# Patient Record
Sex: FEMALE | Race: ASIAN | Employment: OTHER | URBAN - METROPOLITAN AREA
[De-identification: names, ages, dates, MRNs, and addresses within clinical notes are randomized per-mention and may not be internally consistent; named-entity substitution may affect disease eponyms.]

---

## 2017-03-07 DIAGNOSIS — M81.0 OSTEOPOROSIS, UNSPECIFIED: ICD-10-CM

## 2017-03-07 RX ORDER — SODIUM CHLORIDE 9 MG/ML
INJECTION, SOLUTION INTRAVENOUS CONTINUOUS
Status: CANCELLED | OUTPATIENT
Start: 2017-03-10

## 2017-03-07 RX ORDER — ZOLEDRONIC ACID 5 MG/100ML
5 INJECTION, SOLUTION INTRAVENOUS ONCE
Status: CANCELLED | OUTPATIENT
Start: 2017-03-10 | End: 2017-03-10

## 2017-03-07 RX ORDER — SODIUM CHLORIDE 0.9 % (FLUSH) 0.9 %
5 SYRINGE (ML) INJECTION PRN
Status: CANCELLED | OUTPATIENT
Start: 2017-03-10

## 2017-03-07 RX ORDER — SODIUM CHLORIDE 0.9 % (FLUSH) 0.9 %
10 SYRINGE (ML) INJECTION PRN
Status: CANCELLED | OUTPATIENT
Start: 2017-03-10

## 2017-03-07 RX ORDER — 0.9 % SODIUM CHLORIDE 0.9 %
10 VIAL (ML) INJECTION ONCE
Status: CANCELLED | OUTPATIENT
Start: 2017-03-10 | End: 2017-03-10

## 2017-03-07 RX ORDER — METHYLPREDNISOLONE SODIUM SUCCINATE 125 MG/2ML
125 INJECTION, POWDER, LYOPHILIZED, FOR SOLUTION INTRAMUSCULAR; INTRAVENOUS ONCE
Status: CANCELLED | OUTPATIENT
Start: 2017-03-10 | End: 2017-03-10

## 2017-03-07 RX ORDER — HEPARIN SODIUM (PORCINE) LOCK FLUSH IV SOLN 100 UNIT/ML 100 UNIT/ML
500 SOLUTION INTRAVENOUS PRN
Status: CANCELLED | OUTPATIENT
Start: 2017-03-10

## 2017-03-07 RX ORDER — 0.9 % SODIUM CHLORIDE 0.9 %
250 INTRAVENOUS SOLUTION INTRAVENOUS ONCE
Status: CANCELLED | OUTPATIENT
Start: 2017-03-10 | End: 2017-03-10

## 2017-03-07 RX ORDER — 0.9 % SODIUM CHLORIDE 0.9 %
500 INTRAVENOUS SOLUTION INTRAVENOUS ONCE
Status: CANCELLED | OUTPATIENT
Start: 2017-03-10 | End: 2017-03-10

## 2017-03-07 RX ORDER — SODIUM CHLORIDE 9 MG/ML
INJECTION, SOLUTION INTRAVENOUS ONCE
Status: CANCELLED | OUTPATIENT
Start: 2017-03-10 | End: 2017-03-10

## 2017-03-07 RX ORDER — DIPHENHYDRAMINE HYDROCHLORIDE 50 MG/ML
50 INJECTION INTRAMUSCULAR; INTRAVENOUS ONCE
Status: CANCELLED | OUTPATIENT
Start: 2017-03-10 | End: 2017-03-10

## 2017-03-22 ENCOUNTER — HOSPITAL ENCOUNTER (OUTPATIENT)
Dept: INFUSION THERAPY | Age: 72
Discharge: HOME | End: 2017-03-22
Payer: MEDICARE

## 2017-03-22 VITALS
SYSTOLIC BLOOD PRESSURE: 117 MMHG | RESPIRATION RATE: 16 BRPM | TEMPERATURE: 97.8 F | DIASTOLIC BLOOD PRESSURE: 75 MMHG | HEART RATE: 79 BPM

## 2017-03-22 DIAGNOSIS — M81.0 OSTEOPOROSIS, UNSPECIFIED: ICD-10-CM

## 2017-03-22 LAB
ANION GAP SERPL CALCULATED.3IONS-SCNC: 13 MMOL/L (ref 9–17)
BUN BLDV-MCNC: 14 MG/DL (ref 8–23)
BUN/CREAT BLD: ABNORMAL (ref 9–20)
CALCIUM SERPL-MCNC: 9.9 MG/DL (ref 8.6–10.4)
CHLORIDE BLD-SCNC: 95 MMOL/L (ref 98–107)
CO2: 29 MMOL/L (ref 20–31)
CREAT SERPL-MCNC: 0.53 MG/DL (ref 0.5–0.9)
GFR AFRICAN AMERICAN: >60 ML/MIN
GFR NON-AFRICAN AMERICAN: >60 ML/MIN
GFR SERPL CREATININE-BSD FRML MDRD: ABNORMAL ML/MIN/{1.73_M2}
GFR SERPL CREATININE-BSD FRML MDRD: ABNORMAL ML/MIN/{1.73_M2}
GLUCOSE BLD-MCNC: 118 MG/DL (ref 70–99)
MAGNESIUM: 2 MG/DL (ref 1.6–2.6)
PHOSPHORUS: 4.6 MG/DL (ref 2.6–4.5)
POTASSIUM SERPL-SCNC: 4.1 MMOL/L (ref 3.7–5.3)
SODIUM BLD-SCNC: 137 MMOL/L (ref 135–144)

## 2017-03-22 PROCEDURE — 96365 THER/PROPH/DIAG IV INF INIT: CPT

## 2017-03-22 PROCEDURE — 36415 COLL VENOUS BLD VENIPUNCTURE: CPT

## 2017-03-22 PROCEDURE — 84100 ASSAY OF PHOSPHORUS: CPT

## 2017-03-22 PROCEDURE — 80048 BASIC METABOLIC PNL TOTAL CA: CPT

## 2017-03-22 PROCEDURE — 83735 ASSAY OF MAGNESIUM: CPT

## 2017-03-22 PROCEDURE — 2580000003 HC RX 258: Performed by: FAMILY MEDICINE

## 2017-03-22 PROCEDURE — 6360000002 HC RX W HCPCS: Performed by: FAMILY MEDICINE

## 2017-03-22 RX ORDER — SODIUM CHLORIDE 0.9 % (FLUSH) 0.9 %
5 SYRINGE (ML) INJECTION PRN
Status: CANCELLED | OUTPATIENT
Start: 2017-03-22

## 2017-03-22 RX ORDER — SODIUM CHLORIDE 9 MG/ML
INJECTION, SOLUTION INTRAVENOUS ONCE
Status: CANCELLED | OUTPATIENT
Start: 2017-03-22 | End: 2017-03-22

## 2017-03-22 RX ORDER — ZOLEDRONIC ACID 5 MG/100ML
5 INJECTION, SOLUTION INTRAVENOUS ONCE
Status: CANCELLED | OUTPATIENT
Start: 2017-03-22 | End: 2017-03-22

## 2017-03-22 RX ORDER — SODIUM CHLORIDE 9 MG/ML
INJECTION, SOLUTION INTRAVENOUS CONTINUOUS
Status: CANCELLED | OUTPATIENT
Start: 2017-03-22

## 2017-03-22 RX ORDER — 0.9 % SODIUM CHLORIDE 0.9 %
500 INTRAVENOUS SOLUTION INTRAVENOUS ONCE
Status: CANCELLED | OUTPATIENT
Start: 2017-03-22 | End: 2017-03-22

## 2017-03-22 RX ORDER — HEPARIN SODIUM (PORCINE) LOCK FLUSH IV SOLN 100 UNIT/ML 100 UNIT/ML
500 SOLUTION INTRAVENOUS PRN
Status: CANCELLED | OUTPATIENT
Start: 2017-03-22

## 2017-03-22 RX ORDER — 0.9 % SODIUM CHLORIDE 0.9 %
250 INTRAVENOUS SOLUTION INTRAVENOUS ONCE
Status: DISCONTINUED | OUTPATIENT
Start: 2017-03-22 | End: 2017-03-23 | Stop reason: HOSPADM

## 2017-03-22 RX ORDER — 0.9 % SODIUM CHLORIDE 0.9 %
500 INTRAVENOUS SOLUTION INTRAVENOUS ONCE
Status: DISCONTINUED | OUTPATIENT
Start: 2017-03-22 | End: 2017-03-22 | Stop reason: SDUPTHER

## 2017-03-22 RX ORDER — SODIUM CHLORIDE 0.9 % (FLUSH) 0.9 %
10 SYRINGE (ML) INJECTION PRN
Status: CANCELLED | OUTPATIENT
Start: 2017-03-22

## 2017-03-22 RX ORDER — 0.9 % SODIUM CHLORIDE 0.9 %
250 INTRAVENOUS SOLUTION INTRAVENOUS ONCE
Status: CANCELLED | OUTPATIENT
Start: 2017-03-22 | End: 2017-03-22

## 2017-03-22 RX ORDER — 0.9 % SODIUM CHLORIDE 0.9 %
10 VIAL (ML) INJECTION ONCE
Status: CANCELLED | OUTPATIENT
Start: 2017-03-22 | End: 2017-03-22

## 2017-03-22 RX ORDER — ZOLEDRONIC ACID 5 MG/100ML
5 INJECTION, SOLUTION INTRAVENOUS ONCE
Status: COMPLETED | OUTPATIENT
Start: 2017-03-22 | End: 2017-03-22

## 2017-03-22 RX ORDER — SODIUM CHLORIDE 9 MG/ML
INJECTION, SOLUTION INTRAVENOUS ONCE
Status: COMPLETED | OUTPATIENT
Start: 2017-03-22 | End: 2017-03-22

## 2017-03-22 RX ORDER — METHYLPREDNISOLONE SODIUM SUCCINATE 125 MG/2ML
125 INJECTION, POWDER, LYOPHILIZED, FOR SOLUTION INTRAMUSCULAR; INTRAVENOUS ONCE
Status: CANCELLED | OUTPATIENT
Start: 2017-03-22 | End: 2017-03-22

## 2017-03-22 RX ORDER — DIPHENHYDRAMINE HYDROCHLORIDE 50 MG/ML
50 INJECTION INTRAMUSCULAR; INTRAVENOUS ONCE
Status: CANCELLED | OUTPATIENT
Start: 2017-03-22 | End: 2017-03-22

## 2017-03-22 RX ADMIN — ZOLEDRONIC ACID 5 MG: 0.05 INJECTION, SOLUTION INTRAVENOUS at 10:14

## 2017-03-22 RX ADMIN — SODIUM CHLORIDE: 9 INJECTION, SOLUTION INTRAVENOUS at 10:14

## 2017-03-22 NOTE — PROGRESS NOTES
Pt here for Reclast infusion. Labs drawn and reviewed. Pt was treated without incident and discharged in stable condition. Instructed pt to take daily Ca 1200 mg and Vit D 600 Units, pt confirmed understanding.  Pt will return in 1 year for Reclast.

## 2017-03-29 ENCOUNTER — TELEPHONE (OUTPATIENT)
Dept: ONCOLOGY | Age: 72
End: 2017-03-29

## 2017-09-14 ENCOUNTER — HOSPITAL ENCOUNTER (EMERGENCY)
Age: 72
Discharge: HOME | End: 2017-09-14
Attending: EMERGENCY MEDICINE
Payer: MEDICARE

## 2017-09-14 VITALS
BODY MASS INDEX: 20.55 KG/M2 | HEART RATE: 78 BPM | HEIGHT: 63 IN | TEMPERATURE: 98.2 F | OXYGEN SATURATION: 97 % | DIASTOLIC BLOOD PRESSURE: 81 MMHG | WEIGHT: 116 LBS | RESPIRATION RATE: 14 BRPM | SYSTOLIC BLOOD PRESSURE: 124 MMHG

## 2017-09-14 DIAGNOSIS — S46.811A TRAPEZIUS MUSCLE STRAIN, RIGHT, INITIAL ENCOUNTER: ICD-10-CM

## 2017-09-14 DIAGNOSIS — R09.81 NASAL SINUS CONGESTION: Primary | ICD-10-CM

## 2017-09-14 PROCEDURE — 99283 EMERGENCY DEPT VISIT LOW MDM: CPT

## 2017-09-14 RX ORDER — CYCLOBENZAPRINE HCL 10 MG
10 TABLET ORAL 3 TIMES DAILY PRN
Qty: 30 TABLET | Refills: 0 | Status: SHIPPED | OUTPATIENT
Start: 2017-09-14 | End: 2019-08-01 | Stop reason: ALTCHOICE

## 2017-09-14 RX ORDER — FLUTICASONE PROPIONATE 50 MCG
1 SPRAY, SUSPENSION (ML) NASAL 2 TIMES DAILY
Qty: 1 BOTTLE | Refills: 0 | Status: SHIPPED | OUTPATIENT
Start: 2017-09-14 | End: 2019-08-01 | Stop reason: ALTCHOICE

## 2017-09-14 ASSESSMENT — PAIN SCALES - GENERAL: PAINLEVEL_OUTOF10: 5

## 2017-09-14 ASSESSMENT — PAIN DESCRIPTION - LOCATION: LOCATION: HEAD;FACE

## 2017-09-14 ASSESSMENT — PAIN DESCRIPTION - DESCRIPTORS: DESCRIPTORS: PRESSURE;ACHING

## 2017-09-14 ASSESSMENT — PAIN DESCRIPTION - ORIENTATION: ORIENTATION: POSTERIOR;RIGHT

## 2019-08-01 ENCOUNTER — HOSPITAL ENCOUNTER (EMERGENCY)
Age: 74
Discharge: HOME | End: 2019-08-01
Attending: EMERGENCY MEDICINE
Payer: MEDICARE

## 2019-08-01 ENCOUNTER — APPOINTMENT (OUTPATIENT)
Dept: GENERAL RADIOLOGY | Age: 74
End: 2019-08-01
Payer: MEDICARE

## 2019-08-01 VITALS
TEMPERATURE: 98.2 F | SYSTOLIC BLOOD PRESSURE: 126 MMHG | HEART RATE: 79 BPM | BODY MASS INDEX: 20.91 KG/M2 | RESPIRATION RATE: 18 BRPM | WEIGHT: 118 LBS | OXYGEN SATURATION: 99 % | HEIGHT: 63 IN | DIASTOLIC BLOOD PRESSURE: 65 MMHG

## 2019-08-01 DIAGNOSIS — R07.9 CHEST PAIN, UNSPECIFIED TYPE: Primary | ICD-10-CM

## 2019-08-01 LAB
ABSOLUTE EOS #: 0.1 K/UL (ref 0–0.4)
ABSOLUTE IMMATURE GRANULOCYTE: ABNORMAL K/UL (ref 0–0.3)
ABSOLUTE LYMPH #: 2.1 K/UL (ref 1–4.8)
ABSOLUTE MONO #: 0.4 K/UL (ref 0.1–1.2)
ANION GAP SERPL CALCULATED.3IONS-SCNC: 10 MMOL/L (ref 9–17)
BASOPHILS # BLD: 1 % (ref 0–2)
BASOPHILS ABSOLUTE: 0 K/UL (ref 0–0.2)
BUN BLDV-MCNC: 12 MG/DL (ref 8–23)
BUN/CREAT BLD: ABNORMAL (ref 9–20)
CALCIUM SERPL-MCNC: 9.2 MG/DL (ref 8.6–10.4)
CHLORIDE BLD-SCNC: 102 MMOL/L (ref 98–107)
CO2: 24 MMOL/L (ref 20–31)
CREAT SERPL-MCNC: 0.53 MG/DL (ref 0.5–0.9)
DIFFERENTIAL TYPE: ABNORMAL
EOSINOPHILS RELATIVE PERCENT: 2 % (ref 1–4)
GFR AFRICAN AMERICAN: >60 ML/MIN
GFR NON-AFRICAN AMERICAN: >60 ML/MIN
GFR SERPL CREATININE-BSD FRML MDRD: ABNORMAL ML/MIN/{1.73_M2}
GFR SERPL CREATININE-BSD FRML MDRD: ABNORMAL ML/MIN/{1.73_M2}
GLUCOSE BLD-MCNC: 162 MG/DL (ref 70–99)
HCT VFR BLD CALC: 32.7 % (ref 36–46)
HEMOGLOBIN: 11.1 G/DL (ref 12–16)
IMMATURE GRANULOCYTES: ABNORMAL %
LYMPHOCYTES # BLD: 32 % (ref 24–44)
MCH RBC QN AUTO: 28.1 PG (ref 26–34)
MCHC RBC AUTO-ENTMCNC: 33.9 G/DL (ref 31–37)
MCV RBC AUTO: 82.7 FL (ref 80–100)
MONOCYTES # BLD: 7 % (ref 2–11)
NRBC AUTOMATED: ABNORMAL PER 100 WBC
PDW BLD-RTO: 17.5 % (ref 12.5–15.4)
PLATELET # BLD: 344 K/UL (ref 140–450)
PLATELET ESTIMATE: ABNORMAL
PMV BLD AUTO: 7.1 FL (ref 6–12)
POTASSIUM SERPL-SCNC: 3.8 MMOL/L (ref 3.7–5.3)
RBC # BLD: 3.95 M/UL (ref 4–5.2)
RBC # BLD: ABNORMAL 10*6/UL
SEG NEUTROPHILS: 58 % (ref 36–66)
SEGMENTED NEUTROPHILS ABSOLUTE COUNT: 3.9 K/UL (ref 1.8–7.7)
SODIUM BLD-SCNC: 136 MMOL/L (ref 135–144)
TROPONIN INTERP: NORMAL
TROPONIN T: NORMAL NG/ML
TROPONIN, HIGH SENSITIVITY: <6 NG/L (ref 0–14)
WBC # BLD: 6.6 K/UL (ref 3.5–11)
WBC # BLD: ABNORMAL 10*3/UL

## 2019-08-01 PROCEDURE — 80048 BASIC METABOLIC PNL TOTAL CA: CPT

## 2019-08-01 PROCEDURE — 36415 COLL VENOUS BLD VENIPUNCTURE: CPT

## 2019-08-01 PROCEDURE — 93005 ELECTROCARDIOGRAM TRACING: CPT | Performed by: EMERGENCY MEDICINE

## 2019-08-01 PROCEDURE — 85025 COMPLETE CBC W/AUTO DIFF WBC: CPT

## 2019-08-01 PROCEDURE — 84484 ASSAY OF TROPONIN QUANT: CPT

## 2019-08-01 PROCEDURE — 99285 EMERGENCY DEPT VISIT HI MDM: CPT

## 2019-08-01 PROCEDURE — 71045 X-RAY EXAM CHEST 1 VIEW: CPT

## 2019-08-01 NOTE — ED PROVIDER NOTES
NOT REPORTED     Seg Neutrophils 58 36 - 66 %    Lymphocytes 32 24 - 44 %    Monocytes 7 2 - 11 %    Eosinophils % 2 1 - 4 %    Basophils 1 0 - 2 %    Immature Granulocytes NOT REPORTED 0 %    Segs Absolute 3.90 1.8 - 7.7 k/uL    Absolute Lymph # 2.10 1.0 - 4.8 k/uL    Absolute Mono # 0.40 0.1 - 1.2 k/uL    Absolute Eos # 0.10 0.0 - 0.4 k/uL    Basophils # 0.00 0.0 - 0.2 k/uL    Absolute Immature Granulocyte NOT REPORTED 0.00 - 0.30 k/uL    WBC Morphology NOT REPORTED     RBC Morphology NOT REPORTED     Platelet Estimate NOT REPORTED    Troponin   Result Value Ref Range    Troponin, High Sensitivity <6 0 - 14 ng/L    Troponin T NOT REPORTED <0.03 ng/mL    Troponin Interp NOT REPORTED    EKG 12 Lead   Result Value Ref Range    Ventricular Rate 70 BPM    Atrial Rate 70 BPM    P-R Interval 162 ms    QRS Duration 142 ms    Q-T Interval 448 ms    QTc Calculation (Bazett) 483 ms    P Axis 8 degrees    R Axis -16 degrees    T Axis 76 degrees       ABNORMAL LABS:  Labs Reviewed   BASIC METABOLIC PANEL - Abnormal; Notable for the following components:       Result Value    Glucose 162 (*)     All other components within normal limits   CBC WITH AUTO DIFFERENTIAL - Abnormal; Notable for the following components:    RBC 3.95 (*)     Hemoglobin 11.1 (*)     Hematocrit 32.7 (*)     RDW 17.5 (*)     All other components within normal limits   TROPONIN        EKG: All EKG's are interpreted by the Emergency Department Physician who either signs or Co-signs this chart in the absence of a cardiologist.  EKG shows a normal sinus rhythm of 70 with a CO interval 162  QTc 483.  -16 axis. Left bundle branch block pattern. No old to compare with. I interpreted.       EMERGENCY DEPARTMENT COURSE:   Vitals:    Vitals:    08/01/19 1157   BP: 126/65   Pulse: 79   Resp: 18   Temp: 98.2 °F (36.8 °C)   TempSrc: Oral   SpO2: 99%   Weight: 53.5 kg (118 lb)   Height: 5' 3\" (1.6 m)     -------------------------  BP: 126/65, Temp: 98.2 °F

## 2019-08-04 LAB
EKG ATRIAL RATE: 70 BPM
EKG P AXIS: 8 DEGREES
EKG P-R INTERVAL: 162 MS
EKG Q-T INTERVAL: 448 MS
EKG QRS DURATION: 142 MS
EKG QTC CALCULATION (BAZETT): 483 MS
EKG R AXIS: -16 DEGREES
EKG T AXIS: 76 DEGREES
EKG VENTRICULAR RATE: 70 BPM

## 2020-10-05 ENCOUNTER — HOSPITAL ENCOUNTER (OUTPATIENT)
Age: 75
Discharge: HOME | End: 2020-10-07
Payer: MEDICARE

## 2020-10-05 ENCOUNTER — HOSPITAL ENCOUNTER (OUTPATIENT)
Dept: GENERAL RADIOLOGY | Age: 75
Discharge: HOME | End: 2020-10-07
Payer: MEDICARE

## 2020-10-05 PROCEDURE — 72040 X-RAY EXAM NECK SPINE 2-3 VW: CPT

## 2020-12-23 ENCOUNTER — HOSPITAL ENCOUNTER (OUTPATIENT)
Age: 75
Discharge: HOME | End: 2020-12-25
Payer: MEDICARE

## 2020-12-23 ENCOUNTER — HOSPITAL ENCOUNTER (OUTPATIENT)
Dept: GENERAL RADIOLOGY | Age: 75
Discharge: HOME | End: 2020-12-25
Payer: MEDICARE

## 2020-12-23 PROCEDURE — 72190 X-RAY EXAM OF PELVIS: CPT

## 2021-03-29 ENCOUNTER — HOSPITAL ENCOUNTER (EMERGENCY)
Age: 76
Discharge: HOME | End: 2021-03-29
Attending: EMERGENCY MEDICINE
Payer: MEDICARE

## 2021-03-29 ENCOUNTER — APPOINTMENT (OUTPATIENT)
Dept: GENERAL RADIOLOGY | Age: 76
End: 2021-03-29
Payer: MEDICARE

## 2021-03-29 ENCOUNTER — APPOINTMENT (OUTPATIENT)
Dept: CT IMAGING | Age: 76
End: 2021-03-29
Payer: MEDICARE

## 2021-03-29 VITALS
WEIGHT: 120 LBS | HEART RATE: 83 BPM | RESPIRATION RATE: 16 BRPM | SYSTOLIC BLOOD PRESSURE: 129 MMHG | BODY MASS INDEX: 21.26 KG/M2 | OXYGEN SATURATION: 100 % | DIASTOLIC BLOOD PRESSURE: 69 MMHG | TEMPERATURE: 98.1 F

## 2021-03-29 DIAGNOSIS — K21.9 GASTROESOPHAGEAL REFLUX DISEASE, UNSPECIFIED WHETHER ESOPHAGITIS PRESENT: Primary | ICD-10-CM

## 2021-03-29 LAB
-: ABNORMAL
ABSOLUTE EOS #: 0.1 K/UL (ref 0–0.4)
ABSOLUTE IMMATURE GRANULOCYTE: ABNORMAL K/UL (ref 0–0.3)
ABSOLUTE LYMPH #: 2.3 K/UL (ref 1–4.8)
ABSOLUTE MONO #: 0.4 K/UL (ref 0.1–1.2)
ALBUMIN SERPL-MCNC: 4.5 G/DL (ref 3.5–5.2)
ALBUMIN/GLOBULIN RATIO: 1.5 (ref 1–2.5)
ALP BLD-CCNC: 57 U/L (ref 35–104)
ALT SERPL-CCNC: 16 U/L (ref 5–33)
AMORPHOUS: ABNORMAL
ANION GAP SERPL CALCULATED.3IONS-SCNC: 11 MMOL/L (ref 9–17)
AST SERPL-CCNC: 23 U/L
BACTERIA: ABNORMAL
BASOPHILS # BLD: 1 % (ref 0–2)
BASOPHILS ABSOLUTE: 0 K/UL (ref 0–0.2)
BILIRUB SERPL-MCNC: 0.52 MG/DL (ref 0.3–1.2)
BILIRUBIN DIRECT: 0.16 MG/DL
BILIRUBIN URINE: NEGATIVE
BILIRUBIN, INDIRECT: 0.36 MG/DL (ref 0–1)
BUN BLDV-MCNC: 14 MG/DL (ref 8–23)
BUN/CREAT BLD: ABNORMAL (ref 9–20)
CALCIUM SERPL-MCNC: 9.8 MG/DL (ref 8.6–10.4)
CASTS UA: ABNORMAL /LPF
CHLORIDE BLD-SCNC: 100 MMOL/L (ref 98–107)
CO2: 24 MMOL/L (ref 20–31)
COLOR: YELLOW
COMMENT UA: ABNORMAL
CREAT SERPL-MCNC: 0.78 MG/DL (ref 0.5–0.9)
CRYSTALS, UA: ABNORMAL /HPF
DIFFERENTIAL TYPE: ABNORMAL
EOSINOPHILS RELATIVE PERCENT: 1 % (ref 1–4)
EPITHELIAL CELLS UA: ABNORMAL /HPF (ref 0–5)
GFR AFRICAN AMERICAN: >60 ML/MIN
GFR NON-AFRICAN AMERICAN: >60 ML/MIN
GFR SERPL CREATININE-BSD FRML MDRD: ABNORMAL ML/MIN/{1.73_M2}
GFR SERPL CREATININE-BSD FRML MDRD: ABNORMAL ML/MIN/{1.73_M2}
GLOBULIN: NORMAL G/DL (ref 1.5–3.8)
GLUCOSE BLD-MCNC: 101 MG/DL (ref 70–99)
GLUCOSE URINE: NEGATIVE
HCT VFR BLD CALC: 34 % (ref 36–46)
HEMOGLOBIN: 11.2 G/DL (ref 12–16)
IMMATURE GRANULOCYTES: ABNORMAL %
KETONES, URINE: NEGATIVE
LEUKOCYTE ESTERASE, URINE: ABNORMAL
LIPASE: 36 U/L (ref 13–60)
LYMPHOCYTES # BLD: 42 % (ref 24–44)
MCH RBC QN AUTO: 26.5 PG (ref 26–34)
MCHC RBC AUTO-ENTMCNC: 32.9 G/DL (ref 31–37)
MCV RBC AUTO: 80.5 FL (ref 80–100)
MONOCYTES # BLD: 7 % (ref 2–11)
MUCUS: ABNORMAL
NITRITE, URINE: NEGATIVE
NRBC AUTOMATED: ABNORMAL PER 100 WBC
OTHER OBSERVATIONS UA: ABNORMAL
PDW BLD-RTO: 17.4 % (ref 12.5–15.4)
PH UA: 6 (ref 5–8)
PLATELET # BLD: 338 K/UL (ref 140–450)
PLATELET ESTIMATE: ABNORMAL
PMV BLD AUTO: 7.1 FL (ref 6–12)
POTASSIUM SERPL-SCNC: 3.6 MMOL/L (ref 3.7–5.3)
PROTEIN UA: NEGATIVE
RBC # BLD: 4.22 M/UL (ref 4–5.2)
RBC # BLD: ABNORMAL 10*6/UL
RBC UA: ABNORMAL /HPF (ref 0–2)
RENAL EPITHELIAL, UA: ABNORMAL /HPF
SEG NEUTROPHILS: 49 % (ref 36–66)
SEGMENTED NEUTROPHILS ABSOLUTE COUNT: 2.7 K/UL (ref 1.8–7.7)
SODIUM BLD-SCNC: 135 MMOL/L (ref 135–144)
SPECIFIC GRAVITY UA: 1.01 (ref 1–1.03)
TOTAL PROTEIN: 7.6 G/DL (ref 6.4–8.3)
TRICHOMONAS: ABNORMAL
TROPONIN INTERP: NORMAL
TROPONIN INTERP: NORMAL
TROPONIN T: NORMAL NG/ML
TROPONIN T: NORMAL NG/ML
TROPONIN, HIGH SENSITIVITY: 6 NG/L (ref 0–14)
TROPONIN, HIGH SENSITIVITY: 9 NG/L (ref 0–14)
TURBIDITY: CLEAR
URINE HGB: NEGATIVE
UROBILINOGEN, URINE: NORMAL
WBC # BLD: 5.5 K/UL (ref 3.5–11)
WBC # BLD: ABNORMAL 10*3/UL
WBC UA: ABNORMAL /HPF (ref 0–5)
YEAST: ABNORMAL

## 2021-03-29 PROCEDURE — 84484 ASSAY OF TROPONIN QUANT: CPT

## 2021-03-29 PROCEDURE — 96374 THER/PROPH/DIAG INJ IV PUSH: CPT

## 2021-03-29 PROCEDURE — 93005 ELECTROCARDIOGRAM TRACING: CPT | Performed by: EMERGENCY MEDICINE

## 2021-03-29 PROCEDURE — 74177 CT ABD & PELVIS W/CONTRAST: CPT

## 2021-03-29 PROCEDURE — 71046 X-RAY EXAM CHEST 2 VIEWS: CPT

## 2021-03-29 PROCEDURE — 6360000002 HC RX W HCPCS: Performed by: EMERGENCY MEDICINE

## 2021-03-29 PROCEDURE — 81001 URINALYSIS AUTO W/SCOPE: CPT

## 2021-03-29 PROCEDURE — 80076 HEPATIC FUNCTION PANEL: CPT

## 2021-03-29 PROCEDURE — 99284 EMERGENCY DEPT VISIT MOD MDM: CPT

## 2021-03-29 PROCEDURE — 74018 RADEX ABDOMEN 1 VIEW: CPT

## 2021-03-29 PROCEDURE — 80048 BASIC METABOLIC PNL TOTAL CA: CPT

## 2021-03-29 PROCEDURE — 85025 COMPLETE CBC W/AUTO DIFF WBC: CPT

## 2021-03-29 PROCEDURE — 6360000004 HC RX CONTRAST MEDICATION: Performed by: EMERGENCY MEDICINE

## 2021-03-29 PROCEDURE — 36415 COLL VENOUS BLD VENIPUNCTURE: CPT

## 2021-03-29 PROCEDURE — C9113 INJ PANTOPRAZOLE SODIUM, VIA: HCPCS | Performed by: EMERGENCY MEDICINE

## 2021-03-29 PROCEDURE — 83690 ASSAY OF LIPASE: CPT

## 2021-03-29 PROCEDURE — 2580000003 HC RX 258: Performed by: EMERGENCY MEDICINE

## 2021-03-29 RX ORDER — SODIUM CHLORIDE 9 MG/ML
10 INJECTION INTRAVENOUS ONCE
Status: COMPLETED | OUTPATIENT
Start: 2021-03-29 | End: 2021-03-29

## 2021-03-29 RX ORDER — PANTOPRAZOLE SODIUM 40 MG/10ML
40 INJECTION, POWDER, LYOPHILIZED, FOR SOLUTION INTRAVENOUS ONCE
Status: COMPLETED | OUTPATIENT
Start: 2021-03-29 | End: 2021-03-29

## 2021-03-29 RX ORDER — SODIUM CHLORIDE 0.9 % (FLUSH) 0.9 %
10 SYRINGE (ML) INJECTION PRN
Status: DISCONTINUED | OUTPATIENT
Start: 2021-03-29 | End: 2021-03-29 | Stop reason: HOSPADM

## 2021-03-29 RX ORDER — 0.9 % SODIUM CHLORIDE 0.9 %
80 INTRAVENOUS SOLUTION INTRAVENOUS ONCE
Status: COMPLETED | OUTPATIENT
Start: 2021-03-29 | End: 2021-03-29

## 2021-03-29 RX ORDER — PANTOPRAZOLE SODIUM 20 MG/1
40 TABLET, DELAYED RELEASE ORAL DAILY
Qty: 30 TABLET | Refills: 0 | Status: SHIPPED | OUTPATIENT
Start: 2021-03-29

## 2021-03-29 RX ADMIN — IOPAMIDOL 75 ML: 755 INJECTION, SOLUTION INTRAVENOUS at 10:11

## 2021-03-29 RX ADMIN — SODIUM CHLORIDE, PRESERVATIVE FREE 10 ML: 5 INJECTION INTRAVENOUS at 10:11

## 2021-03-29 RX ADMIN — SODIUM CHLORIDE 80 ML: 9 INJECTION, SOLUTION INTRAVENOUS at 10:11

## 2021-03-29 RX ADMIN — PANTOPRAZOLE SODIUM 40 MG: 40 INJECTION, POWDER, FOR SOLUTION INTRAVENOUS at 09:13

## 2021-03-29 RX ADMIN — SODIUM CHLORIDE 10 ML: 9 INJECTION, SOLUTION INTRAMUSCULAR; INTRAVENOUS; SUBCUTANEOUS at 09:14

## 2021-03-29 ASSESSMENT — ENCOUNTER SYMPTOMS
SORE THROAT: 0
PHOTOPHOBIA: 0
CHEST TIGHTNESS: 1
SHORTNESS OF BREATH: 1
NAUSEA: 1
DIARRHEA: 1
ABDOMINAL PAIN: 0

## 2021-03-29 ASSESSMENT — PAIN DESCRIPTION - LOCATION: LOCATION: ABDOMEN

## 2021-03-29 ASSESSMENT — PAIN SCALES - GENERAL: PAINLEVEL_OUTOF10: 4

## 2021-03-29 ASSESSMENT — PAIN DESCRIPTION - PAIN TYPE: TYPE: ACUTE PAIN;CHRONIC PAIN

## 2021-03-29 NOTE — ED PROVIDER NOTES
[id number] [facility] ED  [address] [facility] [address]  Phone: [phone]  Fax: [phone]        Pt Name: Raissa Ham  MRN: [id number]  [place] 1945  Date of evaluation: 3/29/21      CHIEF COMPLAINT     Chief Complaint   Patient presents with    Chest Pain    Abdominal Pain    Nausea    Diarrhea         HISTORY OF PRESENT ILLNESS  (Location/Symptom, Timing/Onset, Context/Setting, Quality, Duration, Modifying Factors, Severity.)    Raissa Ham is a 77 y.o. female who presents stating she has an ulcer problem. She states that on February 25 she was eating something and it upset her stomach. The patient states that at that time, she felt the food got stuck. She states she saw her GI nurse practitioner after that event and she told him this and he said to take Maalox. She states the NP said to return in 6 months and she would see the doctor at that time. She states she had a barium swallow, but she is not sure when it was done. It sounds like she has had some testing done at [facility]. The patient reports she has had a four hour test at that time and had one previously 4 years ago. She then saw her PCP who told her to also take Maalox. She also reports having some left sided chest pain that is worse with respirations. She states that yesterday morning she started having diarrhea. She reports a lot of rumbling in her abdomen. She states she ate chicken soup yesterday and drank water. Her diarrhea improved and she is having soft stools. Her stomach still feels upset. She states her upset stomach symptoms have been ongoing for 1 month, almost daily. She reports a 10 lb unintentional weight loss. She normally weighs 122 pounds, but she weighed herself and was weighing 112 pounds. Here in the emergency department today, the patient is 120 pounds. She states she has abdominal pain that lasts all night, but it is not currently present.  She does not feel her stomach is upset now. She states that her left sided chest pain is intermittent and not currently present. She feels it is associated with her upset stomach, and notices it more after eating. It is not associated with physical exertion. She reports shortness of breath with this chest pain and nausea. She states her last EGD was 7 years ago. The patient is on Nexium and states she takes it every day, but she does not feel it is helping. She states she feels she cannot eat. She has a prior smoking history, hypertension, and is 77years old in terms of cardiac risk factors. There are multiple GI notes from the [facility] system in [facility]. As summarized from her last GI clinic note by Donal Du, APRN-CNP:      EGD 06/01/2017 by Dr. Andres Al showed normal upper third of esophagus, middle third of esophagus and lower third of esophagus. Medium-sized paraesophageal hernia. Erythematous mucosa in the gastric body and antrum. Biopsied. Normal first portion of the duodenum and second portion of the duodenum. PATH: Stomach-negative. No H pylori. Recommendation: Refer to a surgeon. Dr. Josh Alejandro referral on 7/6/2017 and CT showed no evidence of paraesophageal hernia. She does have a high-riding fundus, which as a result appears as if she has a current esophageal hernia on endoscopy. 8/24/2020 [facility] at [facility]: Normal exam at one hour    EGD 08/2013 by Dr. Andres Al showed slightly irregular Z-line s/p biopsies, otherwise esophagus appeared normal, abnormal configuration of the stomach suggesting J-shaped stomach, mild gastritis in the antrum and biopsy, normal duodenal bulb and 2nd portion of the duodenum. PATH: Small bowel -normal small bowel mucosa. No enteritis or celiac disease. Gastric antrum- normal gastric mucosa. No H pylori. Gastric fundus -normal gastric fundus mucosa. No H pylori, Intestinal metaplasia or dysplasia. GE junction - normal GE junction no active esophagitis, intestinal metaplasia or dysplasia.     EGD 04/2012 by Dr. Estrellita Whiteside for dyspepsia and dysphagia showed multiple plaques in the lower 3rd of the esophagus and biopsy, proximal inlet patch, normal examined duodenum, mild gastritis and possible paraesophageal hernia. PATH: Esophagus- Negative. Small-bowel- negative. Stomach-mild chronic gastritis, intestinal metaplasia, no H pylori. Esophagus- mild to moderate chronic esophagitis. Colonoscopy 9/2011 by Dr. Delia Castle showed a normal colon. Colonoscopy 3/2009 by Dr. Estrellita Whiteside for rectal bleeding and diarrhea showed probable ischemic colitis In the distal transverse colon at the splenic flexure and proximal descending colon. PATH: Consistent with resolving or subacute ischemic colitis. Differential includes resolving infectious/self-limited colitis. Colonoscopy 07/2004 by Dr. Chance Sewell for intermittent rectal bleeding, stool positive for OB and chronic constipation showed 2 mm rectal polyp Otherwise normal colon. PATH: Rectal polyp- colonic mucosa with no significant pathologic findings. REVIEW OF SYSTEMS    (2-9 systems for level 4, 10 or more for level 5)     Review of Systems   Constitutional: Negative for chills and fever. HENT: Negative for congestion, ear pain and sore throat. Eyes: Negative for photophobia and visual disturbance. Respiratory: Positive for chest tightness and shortness of breath. Cardiovascular: Positive for chest pain. Negative for palpitations. Gastrointestinal: Positive for diarrhea and nausea. Negative for abdominal pain. Genitourinary: Negative for dysuria, frequency and urgency. Musculoskeletal: Negative for arthralgias and myalgias. Skin: Negative for wound. Neurological: Negative for dizziness and headaches. Hematological: Negative for adenopathy. Does not bruise/bleed easily. PAST MEDICAL HISTORY    has a past medical history of GERD (gastroesophageal reflux disease) and Hypertension.     SURGICAL HISTORY      has a past surgical history that includes pre-malignant / benign skin lesion excision (2/14/13). Cholecystectomy   EGD   Colonoscopy    CURRENTMEDICATIONS       Previous Medications    ASPIRIN 81 MG TABLET    Take 81 mg by mouth daily. FOSINOPRIL SODIUM-HCTZ (MONOPRIL HCT PO)    Take 10 mg by mouth daily     HYDROCHLOROTHIAZIDE PO    Take 25 mg by mouth daily        ALLERGIES     is allergic to seasonal.    FAMILY HISTORY     has no family status information on file. family history is not on file. SOCIAL HISTORY      reports that she has quit smoking. She has never used smokeless tobacco. She reports previous alcohol use. PHYSICAL EXAM    (up to 7 for level 4, 8 or more for level 5)   INITIAL VITALS:  weight is 54.4 kg (120 lb). Her oral temperature is 98.1 °F (36.7 °C). Her blood pressure is 129/69 and her pulse is 83. Her respiration is 16 and oxygen saturation is 100%. Physical Exam  Vitals signs and nursing note reviewed. Constitutional:       General: She is not in acute distress. Appearance: She is well-developed and normal weight. She is not ill-appearing, toxic-appearing or diaphoretic. HENT:      Head: Normocephalic and atraumatic. Cardiovascular:      Rate and Rhythm: Normal rate and regular rhythm. Heart sounds: Normal heart sounds. No murmur. No friction rub. No gallop. Pulmonary:      Effort: Pulmonary effort is normal.      Breath sounds: Normal breath sounds. No wheezing, rhonchi or rales. Abdominal:      General: Abdomen is flat. Bowel sounds are normal. There is no distension. Palpations: Abdomen is soft. Tenderness: There is no abdominal tenderness. Negative signs include Mcdonald's sign and McBurney's sign. Skin:     General: Skin is warm and dry. Capillary Refill: Capillary refill takes less than 2 seconds. Neurological:      General: No focal deficit present. Mental Status: She is alert and oriented to person, place, and time.          DIFFERENTIAL DIAGNOSIS/ MDM: Radiologist below, if available at the time of this note:    Xr Chest (2 Vw)    Result Date: 3/29/2021  EXAMINATION: TWO XRAY VIEWS OF THE CHEST; ONE SUPINE XRAY VIEW(S) OF THE ABDOMEN 3/29/2021 8:57 am COMPARISON: August 1, 2019 HISTORY: ORDERING SYSTEM PROVIDED HISTORY: chest pain TECHNOLOGIST PROVIDED HISTORY: chest pain Reason for Exam: abd discomfort Acuity: Chronic Type of Exam: Initial Additional signs and symptoms: diarrhea Relevant Medical/Surgical History: hx HTN and GERD FINDINGS: Stable cardiomediastinal silhouette. No pulmonary venous congestion or edema. No focal lung consolidation or infiltrate. No pleural effusion or pneumothorax. No intraperitoneal free air. There are mildly dilated loops of small bowel in the upper abdomen and disproportionally decreased bowel gas in the lower abdomen. No mass effect. Osseous structures are grossly intact. No acute cardiopulmonary process. Mildly dilated loops of small bowel in the upper abdomen with disproportionately decreased bowel gas in the lower abdomen. History states diarrhea, therefore this could reflect enteritis. However, the possibility of partial small bowel obstruction with 3 difficult to exclude. CT abdomen and pelvis can be obtained as clinically warranted. Xr Abdomen (kub) (single Ap View)    Result Date: 3/29/2021  EXAMINATION: TWO XRAY VIEWS OF THE CHEST; ONE SUPINE XRAY VIEW(S) OF THE ABDOMEN 3/29/2021 8:57 am COMPARISON: August 1, 2019 HISTORY: ORDERING SYSTEM PROVIDED HISTORY: chest pain TECHNOLOGIST PROVIDED HISTORY: chest pain Reason for Exam: abd discomfort Acuity: Chronic Type of Exam: Initial Additional signs and symptoms: diarrhea Relevant Medical/Surgical History: hx HTN and GERD FINDINGS: Stable cardiomediastinal silhouette. No pulmonary venous congestion or edema. No focal lung consolidation or infiltrate. No pleural effusion or pneumothorax. No intraperitoneal free air.  There are mildly dilated loops of small bowel in the upper abdomen and disproportionally decreased bowel gas in the lower abdomen. No mass effect. Osseous structures are grossly intact. No acute cardiopulmonary process. Mildly dilated loops of small bowel in the upper abdomen with disproportionately decreased bowel gas in the lower abdomen. History states diarrhea, therefore this could reflect enteritis. However, the possibility of partial small bowel obstruction with 3 difficult to exclude. CT abdomen and pelvis can be obtained as clinically warranted. Ct Abdomen Pelvis W Iv Contrast Additional Contrast? None    Result Date: 3/29/2021  EXAMINATION: CT OF THE ABDOMEN AND PELVIS WITH CONTRAST 3/29/2021 10:00 am TECHNIQUE: CT of the abdomen and pelvis was performed with the administration of intravenous contrast. Multiplanar reformatted images are provided for review. Dose modulation, iterative reconstruction, and/or weight based adjustment of the mA/kV was utilized to reduce the radiation dose to as low as reasonably achievable. COMPARISON: None. HISTORY: ORDERING SYSTEM PROVIDED HISTORY: abdominal pain TECHNOLOGIST PROVIDED HISTORY: abdominal pain Decision Support Exception->Emergency Medical Condition (MA) Reason for Exam: abdomina pain, nausea, diarrhea Acuity: Unknown Type of Exam: Unknown FINDINGS: Lower Chest: The lung bases are without consolidation or effusion. The visualized cardiac structures are unremarkable. Organs: Liver and spleen are normal size and overall attenuation. The gallbladder is not visualized and may have been removed. The pancreas and adrenal glands are unremarkable. The kidneys are without obstructive uropathy. There is a simple right renal cyst.  The ureters are not dilated. The urinary bladder is unremarkable. GI/Bowel: The stomach is contracted and otherwise unremarkable. Loops of small bowel are normal in caliber without evidence for obstruction.   The colon contains air and fecal residue and is otherwise unremarkable. There is no intraperitoneal free air or free fluid. The appendix is normal. Pelvis: The uterus is age-appropriate. Peritoneum/Retroperitoneum: The psoas muscles are symmetric. The abdominal aorta is normal in caliber. The inferior vena cava is unremarkable. There is no retroperitoneal or mesenteric adenopathy. Bones/Soft Tissues: The extra-abdominal soft tissues are unremarkable. There is no acute osseous abnormality. No acute abdominal or pelvic abnormality.        LABS:  Results for orders placed or performed during the hospital encounter of 60/83/15   Basic Metabolic Panel   Result Value Ref Range    Glucose 101 (H) 70 - 99 mg/dL    BUN 14 8 - 23 mg/dL    CREATININE 0.78 0.50 - 0.90 mg/dL    Bun/Cre Ratio NOT REPORTED 9 - 20    Calcium 9.8 8.6 - 10.4 mg/dL    Sodium 135 135 - 144 mmol/L    Potassium 3.6 (L) 3.7 - 5.3 mmol/L    Chloride 100 98 - 107 mmol/L    CO2 24 20 - 31 mmol/L    Anion Gap 11 9 - 17 mmol/L    GFR Non-African American >60 >60 mL/min    GFR African American >60 >60 mL/min    GFR Comment          GFR Staging NOT REPORTED    CBC Auto Differential   Result Value Ref Range    WBC 5.5 3.5 - 11.0 k/uL    RBC 4.22 4.0 - 5.2 m/uL    Hemoglobin 11.2 (L) 12.0 - 16.0 g/dL    Hematocrit 34.0 (L) 36 - 46 %    MCV 80.5 80 - 100 fL    MCH 26.5 26 - 34 pg    MCHC 32.9 31 - 37 g/dL    RDW 17.4 (H) 12.5 - 15.4 %    Platelets 777 146 - 102 k/uL    MPV 7.1 6.0 - 12.0 fL    NRBC Automated NOT REPORTED per 100 WBC    Differential Type NOT REPORTED     Seg Neutrophils 49 36 - 66 %    Lymphocytes 42 24 - 44 %    Monocytes 7 2 - 11 %    Eosinophils % 1 1 - 4 %    Basophils 1 0 - 2 %    Immature Granulocytes NOT REPORTED 0 %    Segs Absolute 2.70 1.8 - 7.7 k/uL    Absolute Lymph # 2.30 1.0 - 4.8 k/uL    Absolute Mono # 0.40 0.1 - 1.2 k/uL    Absolute Eos # 0.10 0.0 - 0.4 k/uL    Basophils Absolute 0.00 0.0 - 0.2 k/uL    Absolute Immature Granulocyte NOT REPORTED 0.00 - 0.30 k/uL    WBC Morphology 129/69   Pulse: 82 73 83   Resp: 16 14 16   Temp: 98.1 °F (36.7 °C)     TempSrc: Oral  Oral   SpO2: 99% 99% 100%   Weight: 54.4 kg (120 lb)       -------------------------  BP: 129/69, Temp: 98.1 °F (36.7 °C), Pulse: 83, Resp: 16      RE-EVALUATION:  11:39 Patient resting comfortably. Updated on results. She will follow up with her GI.     CONSULTS:  None     PROCEDURES:  None    FINAL IMPRESSION      1. Gastroesophageal reflux disease, unspecified whether esophagitis present          DISPOSITION/PLAN   DISPOSITION        CONDITION ON DISPOSITION:   Stable     PATIENT REFERRED TO:  Aris Beebe MD  [place] [id number]    Schedule an appointment as soon as possible for a visit in 2 days        please call your gastroentologist for a follow up appointment this week.           DISCHARGE MEDICATIONS:  New Prescriptions    PANTOPRAZOLE (PROTONIX) 20 MG TABLET    Take 2 tablets by mouth daily       (Please note that portions of this note were completed with a voicerecognition program.  Efforts were made to edit the dictations but occasionally words are mis-transcribed.)    Aden Aguila MD  Attending Emergency Medicine Physician        Aden Aguila MD  03/29/21 [id number]

## 2021-03-29 NOTE — ED NOTES
Pt to ED per self. Pt ambulates to room -gait steady. Pt reports chronic abd discomfort- \"belly churning\"/nausea (denies emesis)/diarrhea (denies blood in stool). Pt states she has seen PCP/gastro- put on Nexium- no relief in symptoms. Pt reports left sided CP- worse with deep breathing- ongoing for some time which she has also seen PCP regarding. Pt AOx4. RR easy, unlabored. PWD. Placed in gown/on monitor.       Deneen Chun RN  03/29/21 [id number]

## 2021-03-30 LAB
EKG ATRIAL RATE: 69 BPM
EKG P AXIS: 64 DEGREES
EKG P-R INTERVAL: 174 MS
EKG Q-T INTERVAL: 464 MS
EKG QRS DURATION: 144 MS
EKG QTC CALCULATION (BAZETT): 497 MS
EKG R AXIS: -29 DEGREES
EKG T AXIS: 85 DEGREES
EKG VENTRICULAR RATE: 69 BPM

## 2022-04-19 ENCOUNTER — APPOINTMENT (OUTPATIENT)
Dept: GENERAL RADIOLOGY | Age: 77
End: 2022-04-19
Payer: MEDICARE

## 2022-04-19 ENCOUNTER — HOSPITAL ENCOUNTER (EMERGENCY)
Age: 77
Discharge: HOME | End: 2022-04-19
Attending: EMERGENCY MEDICINE
Payer: MEDICARE

## 2022-04-19 VITALS
BODY MASS INDEX: 21.26 KG/M2 | HEART RATE: 95 BPM | TEMPERATURE: 98.1 F | DIASTOLIC BLOOD PRESSURE: 66 MMHG | WEIGHT: 120 LBS | RESPIRATION RATE: 18 BRPM | SYSTOLIC BLOOD PRESSURE: 145 MMHG | OXYGEN SATURATION: 97 %

## 2022-04-19 DIAGNOSIS — M79.672 LEFT FOOT PAIN: Primary | ICD-10-CM

## 2022-04-19 DIAGNOSIS — M19.072 OSTEOARTHRITIS OF LEFT FOOT, UNSPECIFIED OSTEOARTHRITIS TYPE: ICD-10-CM

## 2022-04-19 PROCEDURE — 99283 EMERGENCY DEPT VISIT LOW MDM: CPT

## 2022-04-19 PROCEDURE — 73630 X-RAY EXAM OF FOOT: CPT

## 2022-04-19 RX ORDER — FLUTICASONE PROPIONATE 50 MCG
2 SPRAY, SUSPENSION (ML) NASAL DAILY
Qty: 16 G | Refills: 0 | Status: SHIPPED | OUTPATIENT
Start: 2022-04-19

## 2022-04-19 ASSESSMENT — ENCOUNTER SYMPTOMS
SINUS PAIN: 0
GASTROINTESTINAL NEGATIVE: 1
WHEEZING: 0
TROUBLE SWALLOWING: 0
RHINORRHEA: 1
VOICE CHANGE: 0
EYES NEGATIVE: 1
SINUS PRESSURE: 0
RESPIRATORY NEGATIVE: 1
SORE THROAT: 0
BACK PAIN: 0
FACIAL SWELLING: 0

## 2022-04-19 NOTE — ED PROVIDER NOTES
[id number] [facility] ED  [address] [address] [facility] [address] [facility] [address]  Phone: [phone]  Fax: [phone]      Attending Physician [address]       Chief Complaint   Patient presents with    Otalgia     recent sinus congestion    Foot Pain     left- no injury- reports pain improving- went to gym yesterday with no reported issue       DIAGNOSTIC RESULTS     LABS:  Labs Reviewed - No data to display    All other labs were within normal range or not returned as of this dictation. RADIOLOGY:  XR FOOT LEFT (MIN 3 VIEWS)    (Results Pending)         EMERGENCY DEPARTMENT COURSE:   Vitals:    Vitals:    04/19/22 1113 04/19/22 1116   BP: (!) 145/66    Pulse: 95    Resp: 18    Temp: 98.1 °F (36.7 °C)    TempSrc: Oral    SpO2: 97%    Weight:  54.4 kg (120 lb)     -------------------------  BP: (!) 145/66, Temp: 98.1 °F (36.7 °C), Pulse: 95, Resp: 18             PERTINENT ATTENDING PHYSICIAN COMMENTS:    I performed a history and physical examination of the patient and discussed management with the mid level provider. I reviewed the mid level provider's note and agree with the documented findings and plan of care. Any areas of disagreement are noted on the chart. I was personally present for the key portions of any procedures. I have documented in the chart those procedures where I was not present during the key portions. I have reviewed the emergency nurses triage note. I agree with the chief complaint, past medical history, past surgical history, allergies, medications, social and family history as documented unless otherwise noted below. Documentation of the HPI, Physical Exam and Medical Decision Making performed by mid level providers is based on my personal performance of the HPI, PE and MDM.  For Physician Assistant/ Nurse Practitioner cases/documentation I have personally evaluated this patient and have completed at least one if not all key elements of the E/M (history, June 30, 2020       Maritza Conklin MD  [address]  [address]  [facility] [address]  VIA In Basket      Patient: Jon Rothman Sr.   YOB: 1947   Date of Visit: 6/30/2020       Dear Dr. Conklin:    I saw your patient, Jon Rothman, for an evaluation. Below are my notes for this visit with him.    If you have questions, please do not hesitate to call me.          Sincerely,        Adrianne Livingston MD        CC: No Recipients  Adrianne Livingston MD  6/30/2020  4:02 PM  Sign when Signing Visit  NEPHROLOGY CONSULT    Referring MD: Maritza Conklin MD    Chief Complaint: PROTEINURIA    Nurses notes reviewed and accepted.    HPI: Jon Rothman Sr. is a 72 year old male referred by Maritza Conklin MD for proteinuria. He has had type 2 diabetes for 20 years, has glaucoma and neuropathy, and has microalbuminuria of 1.13 grams per day, similar to a year ago. The creatinine is still normal. He takes naproxen daily.  No edema. No n/v. Diabetes uncontrolled and the a1c is 9.3.       REVIEW OF SYSTEMS    Constitutional: Negative for fever, chills, diaphoresis, activity change, appetite change, fatigue and unexpected weight change.  HENT: Negative for nosebleeds, sore throat, facial swelling, mouth sores, trouble swallowing, neck pain, neck stiffness and postnasal drip.  Eyes: Negative for pain, discharge and redness. Has glaucoma  Respiratory: Negative for apnea, cough, chest tightness, shortness of breath and wheezing.  Cardiovascular: Negative for chest pain and palpitations.  Gastrointestinal: Negative for nausea, vomiting, abdominal pain, diarrhea and abdominal distention.  Genitourinary: Negative for dysuria, urgency, frequency, hematuria, flank pain, decreased urine volume and difficulty urinating.  Musculoskeletal: Negative for myalgias, back pain, joint swelling, arthralgias and gait problem.  Skin: getting phototherapy and rash diffuse that is a t cell lymphoma of the skin.  Neurological: Negative for  dizziness, tremors, speech difficulty, weakness, numbness and headaches.  Hematological: Does not bruise/bleed easily.  Psychiatric/Behavioral: Negative for confusion, sleep disturbance and agitation. The patient is not nervous/anxious.    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)    • Sleep apnea     cpap   • Spinal stenosis of lumbar region with neurogenic claudication    • Varicose veins of bilateral lower extremities with pain 1/28/2020       Social History     Socioeconomic History   • Marital status: Legally      Spouse name: Not on file   • Number of children: 5   • Years of education: Not on file   • Highest education level: Not on file   Occupational History   • Occupation: [employer]     Comment: retired   Social Needs   • Financial resource strain: Not on file   • Food insecurity:     Worry: Not on file     Inability: Not on file   • Transportation needs:     Medical: Not on file     Non-medical: Not on file   Tobacco Use   • Smoking status: Former Smoker     Types: Cigarettes   • Smokeless tobacco: Never Used   Substance and Sexual Activity   • Alcohol use: No     Alcohol/week: 0.0 standard drinks     Frequency: Never     Drinks per session: 1 or 2     Binge frequency: Never   • Drug use: No   • Sexual activity: Not Currently     Partners: Female   Lifestyle   • Physical activity:     Days per week: Not on file     Minutes per session: Not on file   • Stress: Not on file   Relationships   • Social connections:     Talks on phone: Not on file     Gets together: Not on file     Attends [employer] service: Not on file     Active member of club or organization: Not on file     Attends meetings of clubs or organizations:  physical exam, and MDM). Additional findings are as noted.          (Please note that portions of this note were completed with a voice recognition program.  Efforts were made to edit the dictations but occasionally words are mis-transcribed.)    Kassy Banks DO  Attending Emergency Medicine Physician       Kassy Banks DO  04/19/22 1138 Not on file     Relationship status: Not on file   • Intimate partner violence:     Fear of current or ex partner: Not on file     Emotionally abused: Not on file     Physically abused: Not on file     Forced sexual activity: Not on file   Other Topics Concern   •  Service Not Asked   • Blood Transfusions Not Asked   • Caffeine Concern Not Asked     Comment: 2 cups/d   • Occupational Exposure Not Asked   • Hobby Hazards Not Asked   • Sleep Concern Not Asked   • Stress Concern Not Asked   • Weight Concern Not Asked   • Special Diet Not Asked     Comment: healthy   • Back Care Not Asked   • Exercise Yes     Comment: ADLs   • Bike Helmet Not Asked   • Seat Belt Yes   • Self-Exams Not Asked   Social History Narrative   • Not on file       Past Surgical History:   Procedure Laterality Date   • Appendectomy  2008   • Cataract extraction w/ intraocular lens implant     • Laminectomy  1998   • Skin biopsy      skin cancer removed in the past on the arms   • Spine surgery         Family History   Problem Relation Age of Onset   • Cancer Father    • Diabetes Mother    • Cataracts Mother    • Diabetes Brother         multiple siblings   • Diabetes Sister         multiple siblings       ALLERGIES:   Allergen Reactions   • Bactrim [Sulfamethoxazole W/Trimethoprim] Other (See Comments)     Not sure of reaction   • Sulfa Antibiotics Other (See Comments)     No sure of reaction       Current Outpatient Medications   Medication Sig Dispense Refill   • metFORMIN (GLUCOPHAGE-XR) 500 MG 24 hr tablet 2 tablets every AM and 2 tablets every  tablet 3   • ONETOUCH VERIO test strip Test blood sugar 3 times daily as directed. Diagnosis: E11.9. Meter: verio 100 strip 11   • insulin NPH (HUMULIN N KWIKPEN) 100 UNIT/ML pen-injector Prime 2 units before each dose. Take 40 units at bfst and 40 units after supper. 30 mL 12   • Insulin Pen Needle (PEN NEEDLES) 32G X 5 MM Misc 1 each 2 (two) times a day. Use 2 times per day on NPH pen  100 each 11   • gabapentin (NEURONTIN) 600 MG tablet Take 1 tablet by mouth nightly. 30 tablet 3   • naproxen (NAPROSYN) 375 MG tablet Take 1 tablet by mouth 2 times daily (with meals). 60 tablet 3   • tiZANidine (ZANAFLEX) 4 MG tablet Take 1-2 tablets by mouth at bedtime. 60 tablet 2   • DULoxetine (CYMBALTA) 30 MG capsule Take 1 capsule by mouth daily. 90 capsule 0   • atorvastatin (LIPITOR) 40 MG tablet Take 1 tablet by mouth daily. 90 tablet 4   • carvedilol (COREG) 6.25 MG tablet Take 1 tablet by mouth every 12 hours. 180 tablet 3   • fluticasone (FLONASE) 50 MCG/ACT nasal spray Spray 1 spray in each nostril 2 times daily. 16 g 11   • hydrochlorothiazide (HYDRODIURIL) 12.5 MG tablet Take 1 tablet by mouth daily. 90 tablet 3   • budesonide-formoterol (SYMBICORT) 160-4.5 MCG/ACT inhaler Inhale 2 puffs into the lungs 2 times daily. 10.2 g 1   • lisinopril (ZESTRIL) 40 MG tablet TAKE 1 TABLET BY MOUTH  DAILY 90 tablet 0   • zoster vaccine recomb adjuvanted (SHINGRIX) 50 MCG/0.5ML injection Repeat dose in 2 to 6 months (unless 1 dose already given), for a total of 2 doses. 1 each 1   • diclofenac-gabapentin-lidocaine 3-6-5 % cream Apply 1-2 grams (pumps) to painful joints 3-4 times daily. 240 g 11   • triamcinolone (ARISTOCORT) 0.1 % cream Apply to rash on legs, back, abd, arms bid as directed 454 g 1   • aspirin 81 MG EC tablet Take 1 tablet by mouth daily. Do not start before December 24, 2019. 30 tablet 0   • Ascorbic Acid (VITAMIN C PO) Take 1 tablet by mouth daily.     • acetaminophen (TYLENOL) 650 MG CR tablet Take 650 mg by mouth every 8 hours as needed for Pain.     • Multiple Vitamins-Minerals (MULTIVITAMIN MEN PO) Take 1 tablet by mouth daily.     • albuterol 108 (90 Base) MCG/ACT inhaler Inhale 2 puffs into the lungs every 6 hours as needed for Shortness of Breath or Wheezing. 2 Inhaler 2     No current facility-administered medications for this visit.        PHYSICAL EXAM    Constitutional: He  is  oriented to person, place, and time. He appears well-developed and well-nourished.  He is active and cooperative.  Non-toxic appearance. He does not appear ill. No distress.  Visit Vitals  /70   Pulse 72   Wt 96.6 kg   SpO2 98%   BMI 32.37 kg/m²     Head: Normocephalic and atraumatic.  Nose: Nose normal.  Mouth/Throat: Oropharynx is clear and moist. Dentition is acceptable.  Eyes: Conjunctivae and EOM are normal. Pupils are equal, round, and reactive to light.  No scleral icterus.  Neck: Normal range of motion. Neck supple.  No JVD present. No lymphadenopathy.  No tracheal deviation present. No thyromegaly present.  Cardiovascular: Normal rate, regular rhythm, normal heart sounds, intact distal pulses and normal pulses. Exam reveals no friction rub.  Pulmonary/Chest: Effort normal and breath sounds normal. No accessory muscle usage. Not tachypneic. No respiratory distress. He  has no wheezes.He  has no rales. He exhibits no tenderness.  Abdominal: Soft. Bowel sounds are normal. He exhibits no distension and no ascites.  There is no tenderness. There is no guarding and no CVA tenderness. No bruit.  Musculoskeletal: Normal range of motion. He exhibits no edema and no tenderness.  Neurological: He is alert and oriented to person, place, and time. He displays no atrophy and no tremor. He exhibits normal muscle tone. He displays no seizure activity.Coordination and gait normal.  Skin: Skin is warm. No rash noted. He is not diaphoretic. No cyanosis or erythema. No pallor. Nails show no clubbing.  Psychiatric: He has a normal mood and affect. His behavior is normal. Judgement normal.        Urinalysis in the clinic: 3+ protein, 2+ glucose, no blood, no casts except hyaline seen on microscopy    ALK PHOSPHATASE (Units/L)   Date Value   12/23/2019 109     Alkaline Phosphatase (Units/L)   Date Value   03/20/2020 122 (H)     WBC (K/mcL)   Date Value   03/20/2020 7.4   12/23/2019 6.4     RBC (mil/mcL)   Date Value    03/20/2020 4.61   12/23/2019 4.86     HCT (%)   Date Value   03/20/2020 38.2 (L)   12/23/2019 42.2     HGB (g/dL)   Date Value   03/20/2020 12.3 (L)   12/23/2019 13.2     PLT (K/mcL)   Date Value   03/20/2020 291   12/23/2019 241     Glucose (mg/dL)   Date Value   03/20/2020 249 (H)   03/19/2020 129 (H)   12/23/2019 184 (H)   12/22/2019 160 (H)     Sodium (mmol/L)   Date Value   03/20/2020 137   03/19/2020 138   12/23/2019 139   12/22/2019 139     Potassium (mmol/L)   Date Value   03/20/2020 4.0   03/19/2020 4.0   12/23/2019 3.5   12/22/2019 3.8     Chloride (mmol/L)   Date Value   03/20/2020 101   03/19/2020 103   12/23/2019 103   12/22/2019 104     Carbon Dioxide (mmol/L)   Date Value   03/20/2020 30   03/19/2020 28   12/23/2019 30   12/22/2019 29     BUN (mg/dL)   Date Value   03/20/2020 12   03/19/2020 20   12/23/2019 19   12/22/2019 18     Creatinine (mg/dL)   Date Value   03/20/2020 0.72   03/19/2020 0.80   12/23/2019 1.05   12/22/2019 0.91     CALCIUM (mg/dL)   Date Value   12/23/2019 8.3 (L)   12/22/2019 8.4     Calcium (mg/dL)   Date Value   03/20/2020 8.6   03/19/2020 8.4     GFR Estimate,  (no units)   Date Value   12/23/2019 82   12/22/2019 >90                  ASSESSMENT:  Heavy proteinuria, over one gram daily, due to likely underlying diabetic nephropathy, however exacerbated by the NSAIDS.  Urinalysis otherwise bland.     Membranous nephropathy also possible with the t cell lymphoma of the skin, would not biopsy the kidneys now since he has other causes (dm and nsaids) and the creatinine is normal. Needs reevaluation in a few months.    Hypertension, on repeat check ok, see overall much better than a few months ago. On lisinopril 40 mg dialy, continue that. Discussed the importance of a salt restriction.    NSAID USE.    DM2 UNCONTROLLED.      PLAN:  CONTINUE CURRENT MEDS  WORK ON BETTER DIABETIC CONTROL  STOP NAPROXEN AND AVOID ALL NSAIDS  KEEP BP UNDER 130/80  CUT OUT THE SODA,  EVEN DIET SODA    LABS/FOLLOW UP IN 6 MONTHS.    I will see the patient in follow-up in 6 month(s)    A copy of this note will be forwarded to the referring physician.    Adrianne Livingston MD  Department of Nephrology  [address] [place] [address] [phone]

## 2022-04-19 NOTE — ED PROVIDER NOTES
([birth year]). Social History:  reports that she has quit smoking. She has never used smokeless tobacco. She reports previous alcohol use. Family History: has no family status information on file. family history is not on file. Psychiatric History: None    Allergies: Seasonal    Home Medications:   Prior to Admission medications    Medication Sig Start Date End Date Taking? Authorizing Provider   fluticasone (FLONASE) 50 MCG/ACT nasal spray 2 sprays by Each Nostril route daily 4/19/22  Yes CURTIS Alvarenga NP   pantoprazole (PROTONIX) 20 MG tablet Take 2 tablets by mouth daily 3/29/21   Ar Frausto MD   Esomeprazole Magnesium (NEXIUM PO) Take by mouth as needed  3/29/21  Historical Provider, MD   HYDROCHLOROTHIAZIDE PO Take 25 mg by mouth daily     Historical Provider, MD   aspirin 81 MG tablet Take 81 mg by mouth daily. Historical Provider, MD   Fosinopril Sodium-HCTZ (MONOPRIL HCT PO) Take 10 mg by mouth daily     Historical Provider, MD       REVIEW OF SYSTEMS  (2-9 systems for level 4, 10 ormore for level 5)      Review of Systems   Constitutional: Negative. HENT: Positive for congestion, rhinorrhea and sneezing. Negative for dental problem, drooling, ear discharge, ear pain, facial swelling, hearing loss, mouth sores, nosebleeds, postnasal drip, sinus pressure, sinus pain, sore throat, tinnitus, trouble swallowing and voice change. Eyes: Negative. Respiratory: Negative. Negative for wheezing. Cardiovascular: Negative. Gastrointestinal: Negative. Endocrine: Negative. Genitourinary: Negative. Musculoskeletal: Negative for arthralgias, back pain, gait problem, joint swelling, myalgias, neck pain and neck stiffness. Left foot pain   Skin: Negative. Neurological: Negative. Hematological: Negative. Psychiatric/Behavioral: Negative. All other systems negative except as marked.      PHYSICAL EXAM  (up to 7 for level 4, 8 or more for level 5)      INITIAL VITALS:  weight is 54.4 kg (120 lb). Her oral temperature is 98.1 °F (36.7 °C). Her blood pressure is 145/66 (abnormal) and her pulse is 95. Her respiration is 18 and oxygen saturation is 97%. Vital signs reviewed. Physical Exam  Constitutional:       Appearance: Normal appearance. HENT:      Head: Normocephalic. Right Ear: Tympanic membrane, ear canal and external ear normal.      Left Ear: Tympanic membrane, ear canal and external ear normal.      Ears:      Comments: Cerumen noted bilaterally, worse in the left ear; no impaction currently; patient states she is following with her physician tomorrow in [place] for an ear cleaning, she states she goes every 2 to 8 months depending on when it is needed     Nose: Congestion and rhinorrhea present. Mouth/Throat:      Mouth: Mucous membranes are moist.      Pharynx: Oropharynx is clear. No oropharyngeal exudate or posterior oropharyngeal erythema. Eyes:      General:         Right eye: No discharge. Left eye: No discharge. Extraocular Movements: Extraocular movements intact. Conjunctiva/sclera: Conjunctivae normal.      Pupils: Pupils are equal, round, and reactive to light. Cardiovascular:      Rate and Rhythm: Normal rate and regular rhythm. Pulses: Normal pulses. Heart sounds: Normal heart sounds. Pulmonary:      Effort: Pulmonary effort is normal.      Breath sounds: Normal breath sounds. Abdominal:      General: Bowel sounds are normal.      Palpations: Abdomen is soft. Musculoskeletal:         General: Tenderness present. Normal range of motion. Cervical back: Normal range of motion. Comments: Some tenderness over the second and third metatarsal, a little bit of swelling noted, but it does appear similar to the right foot, patient does have a history of osteoarthritis; pedal pulses are felt strong bilaterally   Lymphadenopathy:      Cervical: No cervical adenopathy.    Skin:     General: Skin is warm and dry. Capillary Refill: Capillary refill takes less than 2 seconds. Neurological:      Mental Status: She is alert and oriented to person, place, and time. Psychiatric:         Mood and Affect: Mood normal.         Behavior: Behavior normal.         Thought Content: Thought content normal.         Judgment: Judgment normal.           DIFFERENTIAL DIAGNOSIS / MDM     Upon exam, patient resting comfortably in her room. Denies any for any pain medication at this time. No distress noted. Heart sounds within normal limits upon auscultation. Lung sounds are clear and equal bilaterally. Patient denies any chest pain or shortness of breath. Bowel sounds are present all 4 quadrants with auscultation. No abdominal pain, distention, guarding, mass noted. Patient is afebrile and is not tachycardic currently. On examination of the nose, there is some nasal congestion and rhinorrhea noted. Turbinates are pink. No mastoid bone tenderness, no tragus tenderness. There is cerumen noted bilaterally in the canal, no impaction noted. Patient does go tomorrow to have her ears cleaned, she states she goes every 2-day months depending on when it is needed. I will order an x-ray of the left foot. Discussed with patient about possibly a noticeable area such as Flonase to help with her congestion, especially when the weather is changing. Her concern is how much it will cost.  I will look at [facility] coupons for patient as well. No acute osseous or soft tissue abnormality noted of the left foot. Degenerative joint disease most pronounced in the midfoot and interphalangeal joint spaces. Patient is encouraged to continue ibuprofen and Tylenol for pain control. She should follow-up with her primary care physician tomorrow as scheduled.   She should return to the emergency department with any new concerning worsening symptoms specially including any inability to walk, gait changes, significant increase in swelling of the foot, cyanosis of the foot, or overall worsening of symptoms. Patient denies need for an Ace wrap at this time. We will send her home with a prescription for Flonase, she is requesting a paper form, and states she may or may not fill it. As stated above, she does follow with her primary care physician tomorrow. Patient states understanding of education and is stable for outpatient follow-up. PLAN (LABS / IMAGING / EKG):  Orders Placed This Encounter   Procedures    XR FOOT LEFT (MIN 3 VIEWS)       MEDICATIONS ORDERED:  Orders Placed This Encounter   Medications    fluticasone (FLONASE) 50 MCG/ACT nasal spray     Si sprays by Each Nostril route daily     Dispense:  16 g     Refill:  0       Controlled Substances Monitoring:     DIAGNOSTIC RESULTS     EKG: All EKG's are interpreted by the Emergency Department Physician who either signs or Co-signs this chart in the absenceof a cardiologist.      RADIOLOGY: All images are read by the radiologist and their interpretations are reviewed. XR FOOT LEFT (MIN 3 VIEWS)   Final Result   No acute osseous or soft tissue abnormality. Degenerative joint disease most pronounced in the midfoot and interphalangeal   joint spaces. No results found. LABS:  No results found for this visit on 22. EMERGENCY DEPARTMENT COURSE           Vitals:    Vitals:    22 1113 22 1116   BP: (!) 145/66    Pulse: 95    Resp: 18    Temp: 98.1 °F (36.7 °C)    TempSrc: Oral    SpO2: 97%    Weight:  54.4 kg (120 lb)     -------------------------  BP: (!) 145/66, Temp: 98.1 °F (36.7 °C), Pulse: 95, Resp: 18      RE-EVALUATION:  See ED Course notes above. CONSULTS:  None    PROCEDURES:  None    FINAL IMPRESSION      1. Left foot pain    2. Osteoarthritis of left foot, unspecified osteoarthritis type          DISPOSITION / PLAN     CONDITION ON DISPOSITION:   Good / Stable for discharge.      PATIENT REFERRED TO:  Edilberto Fuentes, MD  [address]   [address] [facility] [address] [address] [phone]    Go in 1 day      [facility] ED  [address]   [address] [address]  [phone]  Go to   If symptoms worsen      DISCHARGE MEDICATIONS:  New Prescriptions    FLUTICASONE (FLONASE) 50 MCG/ACT NASAL SPRAY    2 sprays by Each Nostril route daily       CURTIS Mo - NP   Emergency Medicine Nurse Practitioner    (Please note that portions of this note were completed with a voice recognition program.  Efforts were made to edit the dictations but occasionally words aremis-transcribed.)     CURTIS Mo NP  04/19/22 3196

## 2022-07-25 ENCOUNTER — HOSPITAL ENCOUNTER (OUTPATIENT)
Dept: MAMMOGRAPHY | Age: 77
Discharge: HOME | End: 2022-07-27
Payer: MEDICARE

## 2022-07-25 VITALS — HEIGHT: 63 IN | WEIGHT: 118 LBS | BODY MASS INDEX: 20.91 KG/M2

## 2022-07-25 DIAGNOSIS — Z12.31 BREAST CANCER SCREENING BY MAMMOGRAM: ICD-10-CM

## 2022-07-25 PROCEDURE — 77063 BREAST TOMOSYNTHESIS BI: CPT

## 2022-09-07 ENCOUNTER — HOSPITAL ENCOUNTER (EMERGENCY)
Age: 77
Discharge: HOME | End: 2022-09-07
Attending: EMERGENCY MEDICINE
Payer: MEDICARE

## 2022-09-07 ENCOUNTER — APPOINTMENT (OUTPATIENT)
Dept: CT IMAGING | Age: 77
End: 2022-09-07
Payer: MEDICARE

## 2022-09-07 VITALS
WEIGHT: 116 LBS | TEMPERATURE: 97.9 F | OXYGEN SATURATION: 98 % | BODY MASS INDEX: 20.55 KG/M2 | DIASTOLIC BLOOD PRESSURE: 111 MMHG | HEART RATE: 107 BPM | SYSTOLIC BLOOD PRESSURE: 170 MMHG | HEIGHT: 63 IN

## 2022-09-07 DIAGNOSIS — R20.3 SENSITIVE SKIN: ICD-10-CM

## 2022-09-07 DIAGNOSIS — D64.9 LOW HEMOGLOBIN: Primary | ICD-10-CM

## 2022-09-07 LAB
ABSOLUTE EOS #: 0.2 K/UL (ref 0–0.4)
ABSOLUTE LYMPH #: 2.7 K/UL (ref 1–4.8)
ABSOLUTE MONO #: 0.4 K/UL (ref 0.1–1.2)
ALBUMIN SERPL-MCNC: 4.5 G/DL (ref 3.5–5.2)
ALBUMIN/GLOBULIN RATIO: 1.5 (ref 1–2.5)
ALP BLD-CCNC: 90 U/L (ref 35–104)
ALT SERPL-CCNC: 26 U/L (ref 5–33)
ANION GAP SERPL CALCULATED.3IONS-SCNC: 13 MMOL/L (ref 9–17)
AST SERPL-CCNC: 27 U/L
BASOPHILS # BLD: 1 % (ref 0–2)
BASOPHILS ABSOLUTE: 0.1 K/UL (ref 0–0.2)
BILIRUB SERPL-MCNC: 0.2 MG/DL (ref 0.3–1.2)
BUN BLDV-MCNC: 24 MG/DL (ref 8–23)
CALCIUM SERPL-MCNC: 9.5 MG/DL (ref 8.6–10.4)
CHLORIDE BLD-SCNC: 91 MMOL/L (ref 98–107)
CO2: 25 MMOL/L (ref 20–31)
CREAT SERPL-MCNC: 1 MG/DL (ref 0.5–0.9)
EOSINOPHILS RELATIVE PERCENT: 2 % (ref 1–4)
GFR AFRICAN AMERICAN: >60 ML/MIN
GFR NON-AFRICAN AMERICAN: 54 ML/MIN
GFR SERPL CREATININE-BSD FRML MDRD: ABNORMAL ML/MIN/{1.73_M2}
GLUCOSE BLD-MCNC: 146 MG/DL (ref 70–99)
HCT VFR BLD CALC: 31.8 % (ref 36–46)
HEMOGLOBIN: 11 G/DL (ref 12–16)
INR BLD: 0.9
LYMPHOCYTES # BLD: 43 % (ref 24–44)
MCH RBC QN AUTO: 28.5 PG (ref 26–34)
MCHC RBC AUTO-ENTMCNC: 34.6 G/DL (ref 31–37)
MCV RBC AUTO: 82.5 FL (ref 80–100)
MONOCYTES # BLD: 7 % (ref 2–11)
PARTIAL THROMBOPLASTIN TIME: 24.8 SEC (ref 21.3–31.3)
PDW BLD-RTO: 16.5 % (ref 12.5–15.4)
PLATELET # BLD: 358 K/UL (ref 140–450)
PMV BLD AUTO: 6.8 FL (ref 6–12)
POTASSIUM SERPL-SCNC: 4.2 MMOL/L (ref 3.7–5.3)
PROTHROMBIN TIME: 9.2 SEC (ref 9.4–12.6)
RBC # BLD: 3.85 M/UL (ref 4–5.2)
SEG NEUTROPHILS: 47 % (ref 36–66)
SEGMENTED NEUTROPHILS ABSOLUTE COUNT: 3 K/UL (ref 1.8–7.7)
SODIUM BLD-SCNC: 129 MMOL/L (ref 135–144)
TOTAL PROTEIN: 7.6 G/DL (ref 6.4–8.3)
WBC # BLD: 6.4 K/UL (ref 3.5–11)

## 2022-09-07 PROCEDURE — 85025 COMPLETE CBC W/AUTO DIFF WBC: CPT

## 2022-09-07 PROCEDURE — 85730 THROMBOPLASTIN TIME PARTIAL: CPT

## 2022-09-07 PROCEDURE — 80053 COMPREHEN METABOLIC PANEL: CPT

## 2022-09-07 PROCEDURE — 70450 CT HEAD/BRAIN W/O DYE: CPT

## 2022-09-07 PROCEDURE — 99284 EMERGENCY DEPT VISIT MOD MDM: CPT

## 2022-09-07 PROCEDURE — 85610 PROTHROMBIN TIME: CPT

## 2022-09-07 PROCEDURE — 36415 COLL VENOUS BLD VENIPUNCTURE: CPT

## 2022-09-07 ASSESSMENT — PAIN DESCRIPTION - ORIENTATION: ORIENTATION: LEFT

## 2022-09-07 ASSESSMENT — PAIN SCALES - GENERAL: PAINLEVEL_OUTOF10: 0

## 2022-09-07 ASSESSMENT — PAIN DESCRIPTION - PAIN TYPE: TYPE: ACUTE PAIN

## 2022-09-07 ASSESSMENT — PAIN DESCRIPTION - DESCRIPTORS: DESCRIPTORS: DISCOMFORT

## 2022-09-07 ASSESSMENT — PAIN DESCRIPTION - FREQUENCY: FREQUENCY: INTERMITTENT

## 2022-09-07 NOTE — ED PROVIDER NOTES
[address] [facility] ED  [address] [facility] [address]  Phone: [phone]  Fax: Wanda Sheets [phone]      Pt Name: Asael Vera  MRN: [id number]  [place] 1945  Date of evaluation: 9/7/2022  Provider: Nhung Harris PA-C    CHIEF COMPLAINT       Chief Complaint   Patient presents with    Headache     Pain left side of head x 1.5 month    Abnormal Lab     States dr. Ellie Bundy and said hemoglobin is low           HISTORY OF PRESENT ILLNESS  (Location/Symptom, Timing/Onset, Context/Setting, Quality, Duration, Modifying Factors, Severity.)   Asael Vera is a 69 y.o. female who presents to the emergency department for evaluation of  11.2 Hbg from outpt labs drawn 7 days ago. No chest/resp/abd/urinary or stooling changes. Denies melena/BRBPR. Anemia history she confirms. Colon metaplasia history as well that is monitored with Colonoscopies, she has a GI Specialist.  Sensitive left scalp, some tenderness for ~ 1.5 months. No associated trauma. She wears glasses but denies that these are tight on her head. She denies rash or injury. Intermittent right ear pain. No change in hearting or otorrhea. No neck/chest/resp/abd//rectal pain. She mentions that she thinks there is some genetic component to her anemia. Nursing Notes were reviewed. REVIEW OF SYSTEMS    (2-9 systems for level 4, 10 or more for level 5)     Review of Systems   Constitutional: Negative. HENT: Negative. Eyes: Negative. Respiratory: Negative. Cardiovascular: Negative. Gastrointestinal: Negative. Musculoskeletal: Negative. Endocrine: Negative. Genitourinary: Negative. Skin: Negative. Allergic/Immunologic: Negative. Neurological: Negative. Hematological: Negative. Psychiatric/Behavioral: Negative. Except as noted above the remainder of the review of systems was reviewed and negative. PAST MEDICAL HISTORY   History reviewed.     Past Medical History: Diagnosis Date    GERD (gastroesophageal reflux disease)     Hypertension          SURGICAL HISTORY     History reviewed. Past Surgical History:   Procedure Laterality Date    PRE-MALIGNANT / BENIGN SKIN LESION EXCISION  2/14/13    excision lesion face         CURRENT MEDICATIONS       Discharge Medication List as of 9/7/2022  5:33 PM        CONTINUE these medications which have NOT CHANGED    Details   fluticasone (FLONASE) 50 MCG/ACT nasal spray 2 sprays by Each Nostril route daily, Disp-16 g, R-0Print      pantoprazole (PROTONIX) 20 MG tablet Take 2 tablets by mouth daily, Disp-30 tablet, R-0Print      HYDROCHLOROTHIAZIDE PO Take 25 mg by mouth daily       aspirin 81 MG tablet Take 81 mg by mouth daily. Fosinopril Sodium-HCTZ (MONOPRIL HCT PO) Take 10 mg by mouth daily              ALLERGIES     Seasonal and Cephalexin    FAMILY HISTORY     History reviewed. No pertinent family history. No family status information on file. SOCIAL HISTORY      reports that she has quit smoking. She has never used smokeless tobacco. She reports that she does not currently use alcohol. lives at home with other     PHYSICAL EXAM    (up to 7 for level 4, 8 or more for level 5)     ED Triage Vitals [09/07/22 1541]   BP Temp Temp Source Heart Rate Resp SpO2 Height Weight   (!) 170/111 97.9 °F (36.6 °C) Oral (!) 107 -- 98 % 5' 2.5\" (1.588 m) 116 lb (52.6 kg)       Physical Exam   Nursing note and vitals reviewed. Constitutional:   Well-developed and well-nourished. No lethargy. Nontoxic. Head: Normocephalic and atraumatic. No rash, edema or erythema. No scalp TTP. No overt signs of termporal TTP. Ear: External ears normal.  Right TM canal wnl. Left TM obscured with cerumen, purple dried coloring of left inner ear appreciable. Nose: Nose normal and midline. Eyes: Conjunctivae and EOM are normal. Pupils are equal/round. 3mm bilat. Neck: Normal range of motion. No TTP.    No meningismus. Oral/Throat: Posterior pharynx wnl, Airway is patent  Cardiovascular: Normal rate, regular rhythm, normal heart sounds   Pulmonary/Chest: Effort normal and breath sounds normal. No wheezes/rales/rhonchi. Abdominal: Soft. Bowel sounds are normal. No distension or obvious mass/herniation. No TTP. Musculoskeletal: Normal to inspection. NV intact x 4. No signs of acute limb ischemia. Neurological: Alert, age appropriate mentation and interaction. Skin: Skin is warm and dry. No rash noted. Psychiatric: Mood, memory, affect and judgment normal.       DIAGNOSTIC RESULTS     EKG: All EKG's are interpreted by the Emergency Department Physician who either signs or Co-signs this chart in the absence of a cardiologist.    Not indicated OR per attending note    RADIOLOGY:   Non-plain film images such as CT, Ultrasound and MRI are read by the radiologist. Plain radiographic images are visualized and preliminarily interpreted by the emergency physician with the below findings:      Interpretation per the Radiologist below, if available at the time of this note:    CT HEAD WO CONTRAST   Final Result   No acute intracranial abnormality.                  LABS:  Labs Reviewed   CBC WITH AUTO DIFFERENTIAL - Abnormal; Notable for the following components:       Result Value    RBC 3.85 (*)     Hemoglobin 11.0 (*)     Hematocrit 31.8 (*)     RDW 16.5 (*)     All other components within normal limits   COMPREHENSIVE METABOLIC PANEL - Abnormal; Notable for the following components:    Glucose 146 (*)     BUN 24 (*)     Creatinine 1.00 (*)     Sodium 129 (*)     Chloride 91 (*)     Total Bilirubin 0.2 (*)     GFR Non- 54 (*)     All other components within normal limits   PROTIME-INR - Abnormal; Notable for the following components:    Protime 9.2 (*)     All other components within normal limits   APTT         All other labs were within normal range or not returned as of this dictation. EMERGENCY DEPARTMENT COURSE and DIFFERENTIAL DIAGNOSIS/MDM:   Vitals:    Vitals:    09/07/22 1541   BP: (!) 170/111   Pulse: (!) 107   Temp: 97.9 °F (36.6 °C)   TempSrc: Oral   SpO2: 98%   Weight: 52.6 kg (116 lb)   Height: 5' 2.5\" (1.588 m)       1626  Pt here after PCP office called to direct her here for an 11.2 Hbg drawn 7 days ago. She has no other complaints other than left scalp tenderness/sensitivity and right ear pain intermittently. Right canal/TM wnl. Alert, appropriate. No deficits by history or exam.  Repeating labs and getting CT Head. No rash or trauma of face/head/neck. Previous anemia history. Denies melena or BRBPR. No abd pain. 1851  Hbg 11.0 today. Further Chart Review demonstrated history of Hbg in the high 10s-low 11 going back as far as around 2015. No acute process here. I discussed our lab and CT findings with her. I cannot explain her left scalp intermittent sensitivity. Recommend PCP f/u. I have reviewed the disposition diagnosis with the patient and or their family/guardian. I have answered their questions and given discharge instructions. They voiced understanding of these instructions and did not have any further questions or complaints. CONSULTS:  None    PROCEDURES:  None    Patient instructed to return to the emergency room if symptoms worsen, return, or any other concern right away which is agreed. FINAL IMPRESSION      1. Low hemoglobin    2. Sensitive skin            DISPOSITION/PLAN   DISPOSITION Decision To Discharge    CONDITION:  Stable    PATIENT REFERRED TO:  Enzo Dietrich MD  [address]   [address] [address] [phone]    Schedule an appointment as soon as possible for a visit in 3 days  for re-evaluation of your symptoms    [facility] ED  [address]   [address] [place], [place] [id number] [phone]  Go to   for worsening of symptoms    DISCHARGE MEDICATIONS:  Discharge Medication List as of 9/7/2022  5:33 PM          (Please note that portions of this note were completed with a voice recognition program.  Efforts were made to edit the dictations but occasionally words are mis-transcribed.)    FABIANO Garcia PA-C  09/07/22 [address] [email or web address]  09/07/22 1900

## 2022-09-07 NOTE — ED PROVIDER NOTES
Emergency Department           COMPLAINT       Chief Complaint   Patient presents with    Headache     Pain left side of head x 1.5 month    Abnormal Lab     States dr. Lucila Villalta and said hemoglobin is low      PHYSICAL EXAM      ED Triage Vitals [09/07/22 1541]   BP Temp Temp Source Heart Rate Resp SpO2 Height Weight   (!) 170/111 97.9 °F (36.6 °C) Oral (!) 107 -- 98 % 5' 2.5\" (1.588 m) 116 lb (52.6 kg)     Constitutional: Alert, oriented x3, nontoxic, answering questions appropriately, acting properly for age, in no acute distress   HEENT: Extraocular muscles intact, mucus membranes moist, left TM clear right TM clear purpleish hue around the external auditory canal no posterior pharyngeal erythema or exudates, Pupils equal, round, reactive to light, left scalp sensitivity no rash  Neck: Trachea midline   Cardiovascular: Regular rhythm and rate no murmurs   Respiratory: Clear to auscultation bilaterally no wheezes, rhonchi, rales, no respiratory distress no tachypnea no retractions no hypoxia  Gastrointestinal: Soft, nontender, nondistended, positive bowel sounds. No rebound, rigidity, or guarding.    Musculoskeletal: No extremity pain or swelling   Neurologic: Moving all 4 extremities without difficulty there are no gross focal neurologic deficits   Skin: Warm and dry       Physical Exam  DIAGNOSTIC RESULTS     EKG: All EKG's are interpreted by the Emergency Department Physician who either signs or Co-signs this chart in the absence of a cardiologist.    Not indicated unless otherwise documented above or in the midlevel documentation    LABS:  Results for orders placed or performed during the hospital encounter of 09/07/22   CBC with Auto Differential   Result Value Ref Range    WBC 6.4 3.5 - 11.0 k/uL    RBC 3.85 (L) 4.0 - 5.2 m/uL    Hemoglobin 11.0 (L) 12.0 - 16.0 g/dL    Hematocrit 31.8 (L) 36 - 46 %    MCV 82.5 80 - 100 fL    MCH 28.5 26 - 34 pg    MCHC 34.6 31 - 37 g/dL    RDW 16.5 (H) 12.5 - 15.4 % Platelets 800 923 - 862 k/uL    MPV 6.8 6.0 - 12.0 fL    Seg Neutrophils 47 36 - 66 %    Lymphocytes 43 24 - 44 %    Monocytes 7 2 - 11 %    Eosinophils % 2 1 - 4 %    Basophils 1 0 - 2 %    Segs Absolute 3.00 1.8 - 7.7 k/uL    Absolute Lymph # 2.70 1.0 - 4.8 k/uL    Absolute Mono # 0.40 0.1 - 1.2 k/uL    Absolute Eos # 0.20 0.0 - 0.4 k/uL    Basophils Absolute 0.10 0.0 - 0.2 k/uL   Comprehensive Metabolic Panel   Result Value Ref Range    Glucose 146 (H) 70 - 99 mg/dL    BUN 24 (H) 8 - 23 mg/dL    Creatinine 1.00 (H) 0.50 - 0.90 mg/dL    Calcium 9.5 8.6 - 10.4 mg/dL    Sodium 129 (L) 135 - 144 mmol/L    Potassium 4.2 3.7 - 5.3 mmol/L    Chloride 91 (L) 98 - 107 mmol/L    CO2 25 20 - 31 mmol/L    Anion Gap 13 9 - 17 mmol/L    Alkaline Phosphatase 90 35 - 104 U/L    ALT 26 5 - 33 U/L    AST 27 <32 U/L    Total Bilirubin 0.2 (L) 0.3 - 1.2 mg/dL    Total Protein 7.6 6.4 - 8.3 g/dL    Albumin 4.5 3.5 - 5.2 g/dL    Albumin/Globulin Ratio 1.5 1.0 - 2.5    GFR Non- 54 (L) >60 mL/min    GFR African American >60 >60 mL/min    GFR Comment         Protime-INR   Result Value Ref Range    Protime 9.2 (L) 9.4 - 12.6 sec    INR 0.9    APTT   Result Value Ref Range    PTT 24.8 21.3 - 31.3 sec       Not indicated unless otherwise documented above or in the midlevel documentation    RADIOLOGY:   I reviewedthe radiologist interpretations:  CT HEAD WO CONTRAST   Final Result   No acute intracranial abnormality. Not indicated unless otherwise documented above or in the midlevel documentation    EMERGENCY DEPARTMENT COURSE:       PERTINENT ATTENDING PHYSICIAN COMMENTS:    Head/scalp sensitivity for 1 month no headache. No blurry vision or double vision. Will obtain a CT of the head. Sent for low hemoglobin of 11.2 from blood test that was done last week denies any blood in her stool. No abdominal pain. No chest pain or shortness of breath no fevers or chills. We will repeat labs.     5:30 PM CT of the

## 2023-05-16 ENCOUNTER — HOSPITAL ENCOUNTER (OUTPATIENT)
Age: 78
Discharge: HOME | End: 2023-05-18
Payer: MEDICARE

## 2023-05-16 ENCOUNTER — HOSPITAL ENCOUNTER (OUTPATIENT)
Dept: GENERAL RADIOLOGY | Age: 78
Discharge: HOME | End: 2023-05-18
Payer: MEDICARE

## 2023-05-16 DIAGNOSIS — K04.7 ABSCESS, DENTAL: ICD-10-CM

## 2023-05-16 PROCEDURE — 70110 X-RAY EXAM OF JAW 4/> VIEWS: CPT

## 2023-08-17 ENCOUNTER — HOSPITAL ENCOUNTER (OUTPATIENT)
Dept: MAMMOGRAPHY | Age: 78
Discharge: HOME | End: 2023-08-19
Payer: MEDICARE

## 2023-08-17 VITALS — WEIGHT: 115 LBS | HEIGHT: 63 IN | BODY MASS INDEX: 20.38 KG/M2

## 2023-08-17 DIAGNOSIS — Z12.31 BREAST CANCER SCREENING BY MAMMOGRAM: ICD-10-CM

## 2023-08-17 PROCEDURE — 77063 BREAST TOMOSYNTHESIS BI: CPT

## 2024-01-05 ENCOUNTER — HOSPITAL ENCOUNTER (OUTPATIENT)
Age: 79
End: 2024-01-05
Payer: MEDICARE

## 2024-01-05 ENCOUNTER — HOSPITAL ENCOUNTER (OUTPATIENT)
Dept: GENERAL RADIOLOGY | Age: 79
End: 2024-01-05
Payer: MEDICARE

## 2024-01-05 DIAGNOSIS — R05.9 COUGH, UNSPECIFIED TYPE: ICD-10-CM

## 2024-01-05 PROCEDURE — 71046 X-RAY EXAM CHEST 2 VIEWS: CPT

## 2024-02-13 ENCOUNTER — APPOINTMENT (OUTPATIENT)
Dept: CT IMAGING | Age: 79
End: 2024-02-13
Payer: MEDICARE

## 2024-02-13 ENCOUNTER — HOSPITAL ENCOUNTER (EMERGENCY)
Age: 79
Discharge: HOME | End: 2024-02-13
Attending: EMERGENCY MEDICINE
Payer: MEDICARE

## 2024-02-13 VITALS
SYSTOLIC BLOOD PRESSURE: 125 MMHG | HEART RATE: 90 BPM | RESPIRATION RATE: 14 BRPM | WEIGHT: 120 LBS | HEIGHT: 63 IN | DIASTOLIC BLOOD PRESSURE: 85 MMHG | OXYGEN SATURATION: 97 % | BODY MASS INDEX: 21.26 KG/M2 | TEMPERATURE: 98 F

## 2024-02-13 DIAGNOSIS — S09.90XA CLOSED HEAD INJURY, INITIAL ENCOUNTER: Primary | ICD-10-CM

## 2024-02-13 PROCEDURE — 99284 EMERGENCY DEPT VISIT MOD MDM: CPT

## 2024-02-13 PROCEDURE — 72125 CT NECK SPINE W/O DYE: CPT

## 2024-02-13 PROCEDURE — 70450 CT HEAD/BRAIN W/O DYE: CPT

## 2024-02-13 PROCEDURE — 6370000000 HC RX 637 (ALT 250 FOR IP)

## 2024-02-13 RX ORDER — ACETAMINOPHEN 500 MG
1000 TABLET ORAL ONCE
Status: COMPLETED | OUTPATIENT
Start: 2024-02-13 | End: 2024-02-13

## 2024-02-13 RX ADMIN — ACETAMINOPHEN 1000 MG: 500 TABLET ORAL at 17:35

## 2024-02-13 NOTE — DISCHARGE INSTRUCTIONS
Take your medication as indicated and prescribed.  For pain use acetaminophen (Tylenol).  You can take over the counter acetaminophen tablets (1 - 2 tablets of the 500-mg strength every 6 hours).  Do not take any medication that contains aspirin / ibuprofen or blood thinning properties until seen by your physician.  Do not do any sporting activity (running, playing basketball / football, etc) until you are seen by your physician.    For persistent headache you can try sitting in a cool, dark room and try taking 1 - 2 tabs (25 - 50 mg) of diphenhydramine (Benadryl) to help you relax.    Continue to follow-up with your doctor on the thyroid nodule incidentally found on your CT scans today.    PLEASE RETURN TO THE EMERGENCY DEPARTMENT IMMEDIATELY for worsening symptoms, persistent headache, change in vision / hearing / taste, ringing in your ears, sleeping more than normal, or if you develop any concerning symptoms such as: high fever not relieved by acetaminophen (Tylenol) and/or ibuprofen (Motrin / Advil), chills, shortness of breath, chest pain, feeling of your heart fluttering or racing, persistent nausea and/or vomiting, vomiting up blood, blood in your stool, loss of consciousness, numbness, weakness or tingling in the arms or legs or change in color of the extremities, changes in mental status, blurry vision, loss of bladder / bowel control, unable to follow up with your physician, or other any other care or concern.

## 2024-02-13 NOTE — ED PROVIDER NOTES
while moving something in the garage.     FINDINGS:   CT SCAN HEAD:     BRAIN/VENTRICLES: There is no acute intracranial hemorrhage, mass effect or   midline shift.  No abnormal extra-axial fluid collection.  The gray-white   differentiation is maintained without evidence of an acute infarct.  There is   no evidence of hydrocephalus. The cerebral sulci and ventricles are enlarged   compatible with diffuse cerebral atrophy. There is low-attenuation in the   periventricular white matter and deep white matter of the brain representing   changes of chronic small vessel ischemic disease.     ORBITS: The visualized portion of the orbits demonstrate no acute abnormality.     SINUSES: The visualized paranasal sinuses and mastoid air cells demonstrate   no acute abnormality.     SOFT TISSUES/SKULL:  No acute abnormality involving the visualized skull or   soft tissues.     CT SCAN CERVICAL SPINE:     BONES/ALIGNMENT: The alignment of the cervical spine is within normal limits.   No acute fractures or dislocations are seen.     DEGENERATIVE CHANGES: There is mild degenerative disc disease of the cervical   spine.     SOFT TISSUES: There is no prevertebral soft tissue swelling.  There is a   low-attenuation nodule in the right thyroid gland measuring 1.1 cm.               PERTINENT ATTENDING PHYSICIAN COMMENTS:    The patient was struck on the head with a crockpot earlier today at home.  It fell off of a shelf and hit her.  She denies LOC.  She has a pain in the forehead.  This occurred about 9 AM today.  Since that time, she has not had vomiting.  She denies neck pain.  She denies focal weakness or numbness.    On exam, the patient has no obvious swelling or hematoma.  She has no abrasion to the head.  The patient has normal ocular motion.  She has no pain or step-off in the neck.  She moves all extremities normally.  She has normal heart and lung sounds.  CT head and neck demonstrate no acute findings. The patient is 
plan.    CONSULTS:  None    PROCEDURES:  None    FINAL IMPRESSION      1. Closed head injury, initial encounter          DISPOSITION / PLAN     CONDITION ON DISPOSITION:   Good / Stable for discharge.     PATIENT REFERRED TO:  Joe Reyes MD  [address]  [address]  [facility] [address] [phone]    Schedule an appointment as soon as possible for a visit       [facility] Emergency Department  [address]  [facility] [address] [phone]  Go to   New or worsening symptoms      DISCHARGE MEDICATIONS:  Discharge Medication List as of 2/13/2024  5:37 PM          CURTIS Espinal CNP   Emergency Medicine Nurse Practitioner    (Please note that portions of this note were completed with a voice recognition program.  Efforts were made to edit the dictations but occasionally words aremis-transcribed.)       Helga Walker APRN - CNP  02/13/24 9826

## 2024-02-21 ENCOUNTER — HOSPITAL ENCOUNTER (OUTPATIENT)
Dept: MAMMOGRAPHY | Age: 79
Discharge: HOME | End: 2024-02-23
Payer: MEDICARE

## 2024-02-21 DIAGNOSIS — M81.0 SENILE OSTEOPOROSIS: ICD-10-CM

## 2024-02-21 PROCEDURE — 77080 DXA BONE DENSITY AXIAL: CPT

## 2024-08-14 ENCOUNTER — HOSPITAL ENCOUNTER (OUTPATIENT)
Dept: GENERAL RADIOLOGY | Age: 79
Discharge: HOME | End: 2024-08-16
Payer: MEDICARE

## 2024-08-14 ENCOUNTER — HOSPITAL ENCOUNTER (OUTPATIENT)
Age: 79
Discharge: HOME | End: 2024-08-16
Payer: MEDICARE

## 2024-08-14 DIAGNOSIS — M53.3 DISORDER OF SACRUM: ICD-10-CM

## 2024-08-14 PROCEDURE — 72220 X-RAY EXAM SACRUM TAILBONE: CPT

## 2024-08-14 PROCEDURE — 72100 X-RAY EXAM L-S SPINE 2/3 VWS: CPT

## 2024-08-19 ENCOUNTER — HOSPITAL ENCOUNTER (OUTPATIENT)
Dept: MAMMOGRAPHY | Age: 79
Discharge: HOME | End: 2024-08-21
Payer: MEDICARE

## 2024-08-19 VITALS — BODY MASS INDEX: 21.26 KG/M2 | HEIGHT: 63 IN | WEIGHT: 120 LBS

## 2024-08-19 DIAGNOSIS — Z12.31 SCREENING MAMMOGRAM FOR HIGH-RISK PATIENT: ICD-10-CM

## 2024-08-19 PROCEDURE — 77063 BREAST TOMOSYNTHESIS BI: CPT

## 2024-12-31 ENCOUNTER — APPOINTMENT (OUTPATIENT)
Dept: GENERAL RADIOLOGY | Age: 79
End: 2024-12-31
Payer: MEDICARE

## 2024-12-31 ENCOUNTER — HOSPITAL ENCOUNTER (EMERGENCY)
Age: 79
Discharge: HOME | End: 2024-12-31
Attending: EMERGENCY MEDICINE
Payer: MEDICARE

## 2024-12-31 VITALS
BODY MASS INDEX: 20.91 KG/M2 | RESPIRATION RATE: 16 BRPM | OXYGEN SATURATION: 98 % | SYSTOLIC BLOOD PRESSURE: 122 MMHG | WEIGHT: 118 LBS | HEART RATE: 83 BPM | TEMPERATURE: 98 F | DIASTOLIC BLOOD PRESSURE: 64 MMHG | HEIGHT: 63 IN

## 2024-12-31 DIAGNOSIS — U07.1 COVID-19: Primary | ICD-10-CM

## 2024-12-31 LAB
FLUAV AG SPEC QL: NEGATIVE
FLUBV AG SPEC QL: NEGATIVE
SARS-COV-2 RDRP RESP QL NAA+PROBE: DETECTED
SPECIMEN DESCRIPTION: ABNORMAL

## 2024-12-31 PROCEDURE — 87635 SARS-COV-2 COVID-19 AMP PRB: CPT

## 2024-12-31 PROCEDURE — 87804 INFLUENZA ASSAY W/OPTIC: CPT

## 2024-12-31 PROCEDURE — 71046 X-RAY EXAM CHEST 2 VIEWS: CPT

## 2024-12-31 PROCEDURE — 99284 EMERGENCY DEPT VISIT MOD MDM: CPT

## 2024-12-31 RX ORDER — PREDNISONE 50 MG/1
50 TABLET ORAL DAILY
Qty: 5 TABLET | Refills: 0 | Status: SHIPPED | OUTPATIENT
Start: 2024-12-31

## 2024-12-31 ASSESSMENT — LIFESTYLE VARIABLES: HOW OFTEN DO YOU HAVE A DRINK CONTAINING ALCOHOL: NEVER

## 2024-12-31 ASSESSMENT — PAIN - FUNCTIONAL ASSESSMENT: PAIN_FUNCTIONAL_ASSESSMENT: NONE - DENIES PAIN

## 2024-12-31 NOTE — ED PROVIDER NOTES
[facility] Emergency Department  [address]  [facility] [address]  Phone: [phone]  Fax: [phone]  EMERGENCY DEPARTMENT ENCOUNTER      Pt Name: Kalyan Bahena  MRN: [id number]  Birthdate 1945  Date of evaluation: 12/31/2024    CHIEF COMPLAINT       Chief Complaint   Patient presents with    Cough    Nasal Congestion     Cough and congestion since Christmas; denies asthma or COPD. Poss exposure to COVID - wants checked for COVID. Loss of taste and smell       HISTORY OF PRESENT ILLNESS    Kalyan Bahena is a 79 y.o. female who presents to the emergency room complaining of cough and congestion since Christmas.  No history of COPD non-smoker.  She was exposed to COVID.  She has lost her sense of taste and smell.  She has had a productive cough.  No fevers but does admit to body aches.  No chest pain or shortness of breath.  No other relevant symptoms.    REVIEW OF SYSTEMS       Constitutional: No fevers or chills  positive body aches  HENT: No sore throat, positive rhinorrhea, no earache   Eyes: No blurry vision or double vision no drainage   Cardiovascular: No chest pain or tachycardia   Respiratory: No wheezing or shortness of breath Positive productive yellowish sputum cough   Gastrointestinal: No nausea, vomiting, diarrhea, constipation, or abdominal pain   : No hematuria or dysuria   Musculoskeletal: No swelling or pain   Skin: No rash   Neurological: No focal neurologic complaints, paresthesias, weakness, or headache     PAST MEDICAL HISTORY    has a past medical history of GERD (gastroesophageal reflux disease) and Hypertension.    SURGICAL HISTORY      has a past surgical history that includes pre-malignant / benign skin lesion excision (2/14/13).    CURRENT MEDICATIONS       Discharge Medication List as of 12/31/2024  8:42 AM        CONTINUE these medications which have NOT CHANGED    Details   fluticasone (FLONASE) 50 MCG/ACT nasal spray 2 sprays by Each Nostril route daily,

## 2024-12-31 NOTE — DISCHARGE INSTRUCTIONS
Take medications as prescribed  Follow-up with family physician for reevaluation    Return immediately if any worsening symptoms or any other concerns    Please understand that early in the process of an illness or injury, an emergency department workup can be falsely reassuring.      Tell us how we did visit: [email or web address]   and let us know about your experience

## 2025-01-07 ENCOUNTER — HOSPITAL ENCOUNTER (OUTPATIENT)
Age: 80
Discharge: HOME | End: 2025-01-09
Payer: MEDICARE

## 2025-01-07 ENCOUNTER — HOSPITAL ENCOUNTER (OUTPATIENT)
Dept: GENERAL RADIOLOGY | Age: 80
Discharge: HOME | End: 2025-01-09
Payer: MEDICARE

## 2025-01-07 DIAGNOSIS — M25.552 LEFT HIP PAIN: ICD-10-CM

## 2025-01-07 DIAGNOSIS — R07.81 PLEURITIC CHEST PAIN: ICD-10-CM

## 2025-01-07 PROCEDURE — 73502 X-RAY EXAM HIP UNI 2-3 VIEWS: CPT

## 2025-01-07 PROCEDURE — 71100 X-RAY EXAM RIBS UNI 2 VIEWS: CPT

## 2025-04-20 ENCOUNTER — TRANSCRIBE ORDERS (OUTPATIENT)
Dept: ADMINISTRATIVE | Age: 80
End: 2025-04-20

## 2025-04-20 DIAGNOSIS — R01.1 HEART MURMUR: Primary | ICD-10-CM

## 2025-07-11 ENCOUNTER — TRANSCRIBE ORDERS (OUTPATIENT)
Dept: ADMINISTRATIVE | Age: 80
End: 2025-07-11

## 2025-07-11 DIAGNOSIS — Z12.31 ENCOUNTER FOR SCREENING MAMMOGRAM FOR MALIGNANT NEOPLASM OF BREAST: Primary | ICD-10-CM
